# Patient Record
Sex: FEMALE | Race: WHITE | NOT HISPANIC OR LATINO | Employment: OTHER | URBAN - METROPOLITAN AREA
[De-identification: names, ages, dates, MRNs, and addresses within clinical notes are randomized per-mention and may not be internally consistent; named-entity substitution may affect disease eponyms.]

---

## 2019-08-29 ENCOUNTER — TRANSFERRED RECORDS (OUTPATIENT)
Dept: HEALTH INFORMATION MANAGEMENT | Facility: CLINIC | Age: 69
End: 2019-08-29

## 2021-09-16 ENCOUNTER — TRANSFERRED RECORDS (OUTPATIENT)
Dept: HEALTH INFORMATION MANAGEMENT | Facility: CLINIC | Age: 71
End: 2021-09-16

## 2022-03-18 ENCOUNTER — TRANSFERRED RECORDS (OUTPATIENT)
Dept: HEALTH INFORMATION MANAGEMENT | Facility: CLINIC | Age: 72
End: 2022-03-18

## 2022-06-23 ENCOUNTER — OFFICE VISIT (OUTPATIENT)
Dept: URGENT CARE | Facility: URGENT CARE | Age: 72
End: 2022-06-23
Payer: MEDICARE

## 2022-06-23 VITALS
OXYGEN SATURATION: 100 % | DIASTOLIC BLOOD PRESSURE: 75 MMHG | SYSTOLIC BLOOD PRESSURE: 110 MMHG | HEART RATE: 65 BPM | TEMPERATURE: 97.8 F | WEIGHT: 120 LBS

## 2022-06-23 DIAGNOSIS — R30.0 DYSURIA: Primary | ICD-10-CM

## 2022-06-23 LAB
ALBUMIN UR-MCNC: NEGATIVE MG/DL
APPEARANCE UR: CLEAR
BACTERIA #/AREA URNS HPF: ABNORMAL /HPF
BILIRUB UR QL STRIP: NEGATIVE
COLOR UR AUTO: YELLOW
GLUCOSE UR STRIP-MCNC: NEGATIVE MG/DL
HGB UR QL STRIP: ABNORMAL
KETONES UR STRIP-MCNC: NEGATIVE MG/DL
LEUKOCYTE ESTERASE UR QL STRIP: ABNORMAL
NITRATE UR QL: NEGATIVE
PH UR STRIP: 6 [PH] (ref 5–7)
RBC #/AREA URNS AUTO: ABNORMAL /HPF
SP GR UR STRIP: <=1.005 (ref 1–1.03)
SQUAMOUS #/AREA URNS AUTO: ABNORMAL /LPF
UROBILINOGEN UR STRIP-ACNC: 0.2 E.U./DL
WBC #/AREA URNS AUTO: ABNORMAL /HPF

## 2022-06-23 PROCEDURE — 99203 OFFICE O/P NEW LOW 30 MIN: CPT | Performed by: NURSE PRACTITIONER

## 2022-06-23 PROCEDURE — 81001 URINALYSIS AUTO W/SCOPE: CPT | Performed by: NURSE PRACTITIONER

## 2022-06-23 RX ORDER — DORZOLAMIDE HYDROCHLORIDE AND TIMOLOL MALEATE 20; 5 MG/ML; MG/ML
1 SOLUTION/ DROPS OPHTHALMIC
COMMUNITY
Start: 2022-04-04

## 2022-06-23 RX ORDER — LATANOPROST 50 UG/ML
1 SOLUTION/ DROPS OPHTHALMIC
COMMUNITY
Start: 2022-04-04 | End: 2023-02-22

## 2022-06-23 RX ORDER — SULFAMETHOXAZOLE/TRIMETHOPRIM 800-160 MG
1 TABLET ORAL 2 TIMES DAILY
Qty: 14 TABLET | Refills: 0 | Status: SHIPPED | OUTPATIENT
Start: 2022-06-23 | End: 2022-06-30

## 2022-06-23 NOTE — PROGRESS NOTES
Assessment & Plan     Dysuria    - UA reflex to Microscopic and Culture  - Urine Microscopic  - sulfamethoxazole-trimethoprim (BACTRIM DS) 800-160 MG tablet  Dispense: 14 tablet; Refill: 0  Bactrim BID x 7 days  Push fluids  Pyridium PRN - urine will be dark orange   Antibiotics twice a day  for 7 days  F/u in 1 week if persists or 3 days if worsening.          Return in about 2 days (around 6/25/2022) for with regular provider if symptoms persist.    RAFAEL Lyle CNP  [facility] DIMITRI Iqbal is a 71 year old female who presents to clinic today for the following health issues:  Chief Complaint   Patient presents with     Urgent Care     Burning pain when uniating that started last night.      HPI    UTI    Onset of symptoms was 1day(s).  Course of illness is same  Severity moderate  Current and associated symptoms dysuria, frequency, urgency and hesitancy  Treatment and measures tried Increase fluid intake  Predisposing factors include none  Patient denies flank pain, temperature > 101 degrees F., vomiting, vaginal discharge and vaginal odor              Review of Systems  Constitutional, HEENT, cardiovascular, pulmonary, GI, , musculoskeletal, neuro, skin, endocrine and psych systems are negative, except as otherwise noted.      Objective    /75 (BP Location: Left arm, Patient Position: Sitting, Cuff Size: Adult Regular)   Pulse 65   Temp 97.8  F (36.6  C) (Tympanic)   Wt 54.4 kg (120 lb)   SpO2 100%   Physical Exam   GENERAL: healthy, alert and no distress  EYES: Eyes grossly normal to inspection, PERRL and conjunctivae and sclerae normal  RESP: lungs clear to auscultation - no rales, rhonchi or wheezes  CV: regular rate and rhythm, normal S1 S2, no S3 or S4, no murmur, click or rub, no peripheral edema and peripheral pulses strong  ABDOMEN: soft, nontender, no hepatosplenomegaly, no masses and bowel sounds normal  MS: no gross musculoskeletal defects  noted, no edema  SKIN: no suspicious lesions or rashes

## 2022-06-23 NOTE — PATIENT INSTRUCTIONS
Results for orders placed or performed in visit on 06/23/22   UA reflex to Microscopic and Culture     Status: Abnormal    Specimen: Urine, Midstream   Result Value Ref Range    Color Urine Yellow Colorless, Straw, Light Yellow, Yellow    Appearance Urine Clear Clear    Glucose Urine Negative Negative mg/dL    Bilirubin Urine Negative Negative    Ketones Urine Negative Negative mg/dL    Specific Gravity Urine <=1.005 1.003 - 1.035    Blood Urine Moderate (A) Negative    pH Urine 6.0 5.0 - 7.0    Protein Albumin Urine Negative Negative mg/dL    Urobilinogen Urine 0.2 0.2, 1.0 E.U./dL    Nitrite Urine Negative Negative    Leukocyte Esterase Urine Small (A) Negative   Urine Microscopic     Status: Abnormal   Result Value Ref Range    Bacteria Urine Few (A) None Seen /HPF    RBC Urine 2-5 (A) 0-2 /HPF /HPF    WBC Urine 5-10 (A) 0-5 /HPF /HPF    Squamous Epithelials Urine Few (A) None Seen /LPF    Narrative    Urine Culture not indicated

## 2022-08-25 ENCOUNTER — TRANSFERRED RECORDS (OUTPATIENT)
Dept: HEALTH INFORMATION MANAGEMENT | Facility: CLINIC | Age: 72
End: 2022-08-25

## 2022-08-29 ENCOUNTER — OFFICE VISIT (OUTPATIENT)
Dept: FAMILY MEDICINE | Facility: CLINIC | Age: 72
End: 2022-08-29
Payer: MEDICARE

## 2022-08-29 VITALS
SYSTOLIC BLOOD PRESSURE: 103 MMHG | HEIGHT: 67 IN | DIASTOLIC BLOOD PRESSURE: 58 MMHG | WEIGHT: 120 LBS | RESPIRATION RATE: 16 BRPM | BODY MASS INDEX: 18.83 KG/M2 | OXYGEN SATURATION: 98 % | HEART RATE: 65 BPM | TEMPERATURE: 98 F

## 2022-08-29 DIAGNOSIS — R30.0 DYSURIA: Primary | ICD-10-CM

## 2022-08-29 PROBLEM — E03.8 OTHER SPECIFIED HYPOTHYROIDISM: Status: ACTIVE | Noted: 2022-08-29

## 2022-08-29 PROBLEM — F02.80 ALZHEIMER'S DISEASE (H): Status: ACTIVE | Noted: 2022-08-29

## 2022-08-29 PROBLEM — G30.9 ALZHEIMER'S DISEASE (H): Status: ACTIVE | Noted: 2022-08-29

## 2022-08-29 LAB
ALBUMIN UR-MCNC: NEGATIVE MG/DL
AMORPH CRY #/AREA URNS HPF: ABNORMAL /HPF
APPEARANCE UR: CLEAR
BACTERIA #/AREA URNS HPF: ABNORMAL /HPF
BILIRUB UR QL STRIP: NEGATIVE
COLOR UR AUTO: YELLOW
GLUCOSE UR STRIP-MCNC: NEGATIVE MG/DL
HGB UR QL STRIP: ABNORMAL
KETONES UR STRIP-MCNC: NEGATIVE MG/DL
LEUKOCYTE ESTERASE UR QL STRIP: ABNORMAL
NITRATE UR QL: NEGATIVE
PH UR STRIP: 7 [PH] (ref 5–7)
RBC #/AREA URNS AUTO: ABNORMAL /HPF
SP GR UR STRIP: 1.01 (ref 1–1.03)
SQUAMOUS #/AREA URNS AUTO: ABNORMAL /LPF
UROBILINOGEN UR STRIP-ACNC: 0.2 E.U./DL
WBC #/AREA URNS AUTO: ABNORMAL /HPF

## 2022-08-29 PROCEDURE — 99213 OFFICE O/P EST LOW 20 MIN: CPT | Performed by: INTERNAL MEDICINE

## 2022-08-29 PROCEDURE — 87086 URINE CULTURE/COLONY COUNT: CPT | Performed by: INTERNAL MEDICINE

## 2022-08-29 PROCEDURE — 87186 SC STD MICRODIL/AGAR DIL: CPT | Performed by: INTERNAL MEDICINE

## 2022-08-29 PROCEDURE — 81001 URINALYSIS AUTO W/SCOPE: CPT | Performed by: INTERNAL MEDICINE

## 2022-08-29 RX ORDER — NITROFURANTOIN 25; 75 MG/1; MG/1
100 CAPSULE ORAL 2 TIMES DAILY
Qty: 10 CAPSULE | Refills: 0 | Status: SHIPPED | OUTPATIENT
Start: 2022-08-29 | End: 2022-09-09

## 2022-08-29 NOTE — PROGRESS NOTES
This is a 71-year-old female with dementia who presents with her daughter.  She was supposed to be here to establish care but the doctor who she was supposed to see today is out ill also I am seeing her for her urine symptoms.  She does have a history of UTIs last being June.  The last 2 days she has had dysuria and possibly hematuria.  She denies abdominal pain or nausea or diarrhea.  No fevers.  She is not unsteady or falling and there is no other health changes that we know of.  She does have dementia so I am not entirely sure that her history is 100% accurate but the daughter does feel as if what she is saying is correct.  The patient herself lives in a [facility] by herself and does fairly well.  She is  and has 1 child.  She otherwise notes she is feeling fine with no other complaints.    Past Medical History:   Diagnosis Date     Alzheimer's disease (H)     eval at [place]     Other specified hypothyroidism      Past Surgical History:   Procedure Laterality Date     CATARACT EXTRACTION       Social History     Socioeconomic History     Marital status:      Spouse name: Not on file     Number of children: 1     Years of education: Not on file     Highest education level: Not on file   Occupational History     Not on file   Tobacco Use     Smoking status: Never Smoker     Smokeless tobacco: Never Used   Substance and Sexual Activity     Alcohol use: Not Currently     Drug use: Not on file     Sexual activity: Not on file   Other Topics Concern     Not on file   Social History Narrative     Not on file     Social Determinants of Health     Financial Resource Strain: Not on file   Food Insecurity: Not on file   Transportation Needs: Not on file   Physical Activity: Not on file   Stress: Not on file   Social Connections: Not on file   Intimate Partner Violence: Not on file   Housing Stability: Not on file     Current Outpatient Medications   Medication Sig Dispense Refill     dorzolamide-timolol  "(COSOPT) 2-0.5 % ophthalmic solution Apply 1 drop to eye       latanoprost (XALATAN) 0.005 % ophthalmic solution Apply 1 drop to eye       LEVOTHYROXINE SODIUM PO Take by mouth At Bedtime       No Known Allergies  FAMILY HISTORY NOTED AND REVIEWED    REVIEW OF SYSTEMS: above    PHYSICAL EXAM    /58 (BP Location: Right arm, Patient Position: Chair, Cuff Size: Adult Large)   Pulse 65   Temp 98  F (36.7  C) (Tympanic)   Resp 16   Ht 1.702 m (5' 7\")   Wt 54.4 kg (120 lb)   SpO2 98%   Breastfeeding No   BMI 18.79 kg/m      Patient appears non toxic  Lungs clear  cv reglar rate and rhythm, no murmer, rub or gallop, no jvp or edema  Abdomen normal active bowel sounds, soft non-tender, no mgr, no hepatosplenomegaly    Stat ua noted, uc sent    ASSESSMENT:  1. Uncomplicated uti, doubt other cause, no signs sepsis, pyelo, etc  2. Dementia, presumed alzheimers  3. Hypothyroidism    PLAN:  macrobid bid for 5 days  Follow up to establish primary care  Call if symptoms not resolving in next 24 to 48 hours or gets worse    Justin Fu M.D.        "

## 2022-08-31 LAB — BACTERIA UR CULT: ABNORMAL

## 2022-09-05 ENCOUNTER — NURSE TRIAGE (OUTPATIENT)
Dept: NURSING | Facility: CLINIC | Age: 72
End: 2022-09-05

## 2022-09-05 ENCOUNTER — OFFICE VISIT (OUTPATIENT)
Dept: URGENT CARE | Facility: URGENT CARE | Age: 72
End: 2022-09-05
Payer: MEDICARE

## 2022-09-05 VITALS
DIASTOLIC BLOOD PRESSURE: 62 MMHG | OXYGEN SATURATION: 99 % | TEMPERATURE: 97.1 F | HEART RATE: 63 BPM | WEIGHT: 115 LBS | SYSTOLIC BLOOD PRESSURE: 98 MMHG | BODY MASS INDEX: 18.01 KG/M2

## 2022-09-05 DIAGNOSIS — R30.0 DYSURIA: Primary | ICD-10-CM

## 2022-09-05 DIAGNOSIS — N39.0 URINARY TRACT INFECTION WITHOUT HEMATURIA, SITE UNSPECIFIED: ICD-10-CM

## 2022-09-05 DIAGNOSIS — R30.0 DYSURIA: ICD-10-CM

## 2022-09-05 LAB
ALBUMIN UR-MCNC: 100 MG/DL
APPEARANCE UR: ABNORMAL
BACTERIA #/AREA URNS HPF: ABNORMAL /HPF
BILIRUB UR QL STRIP: NEGATIVE
COLOR UR AUTO: YELLOW
GLUCOSE UR STRIP-MCNC: NEGATIVE MG/DL
HGB UR QL STRIP: ABNORMAL
KETONES UR STRIP-MCNC: NEGATIVE MG/DL
LEUKOCYTE ESTERASE UR QL STRIP: ABNORMAL
NITRATE UR QL: NEGATIVE
PH UR STRIP: 7 [PH] (ref 5–7)
RBC #/AREA URNS AUTO: ABNORMAL /HPF
SP GR UR STRIP: 1.01 (ref 1–1.03)
UROBILINOGEN UR STRIP-ACNC: 0.2 E.U./DL
WBC #/AREA URNS AUTO: >100 /HPF
WBC CLUMPS #/AREA URNS HPF: PRESENT /HPF

## 2022-09-05 PROCEDURE — 81001 URINALYSIS AUTO W/SCOPE: CPT

## 2022-09-05 PROCEDURE — 87086 URINE CULTURE/COLONY COUNT: CPT | Performed by: PHYSICIAN ASSISTANT

## 2022-09-05 PROCEDURE — 99213 OFFICE O/P EST LOW 20 MIN: CPT | Performed by: PHYSICIAN ASSISTANT

## 2022-09-05 RX ORDER — CEFDINIR 300 MG/1
300 CAPSULE ORAL 2 TIMES DAILY
Qty: 14 CAPSULE | Refills: 0 | Status: SHIPPED | OUTPATIENT
Start: 2022-09-05 | End: 2022-09-05

## 2022-09-05 RX ORDER — CEFDINIR 300 MG/1
300 CAPSULE ORAL 2 TIMES DAILY
Qty: 14 CAPSULE | Refills: 0 | Status: SHIPPED | OUTPATIENT
Start: 2022-09-05 | End: 2022-09-15

## 2022-09-05 NOTE — PROGRESS NOTES
Assessment & Plan     Dysuria    Urine culture pending  UA positive    - UA reflex to Microscopic and Culture  - Urine Microscopic Exam  - Urine Culture  - cefdinir (OMNICEF) 300 MG capsule; Take 1 capsule (300 mg) by mouth 2 times daily for 7 days    Urinary tract infection without hematuria, site unspecified    Bladder infections are not contagious. You can't get one from someone else, from a toilet seat, or from sharing a bath.  The most common cause of bladder infections is bacteria from the bowels. The bacteria get onto the skin around the opening of the urethra. From there, they can get into the urine. Then they travel up to the bladder, causing inflammation and infection. This often happens because of:    Wiping incorrectly after urinating. Always wipe from front to back.    Bowel incontinence    Pregnancy    Procedures such as having a catheter put in    Older age    Not emptying your bladder. This can give bacteria a chance to grow in your urine.    Fluid loss (dehydration)    Constipation    Having sex    Using a diaphragm for birth control   Treatment  Bladder infections are diagnosed by a urine test and urine culture. They are treated with antibiotics. They often clear up quickly without problems. Treatment helps prevent a more serious kidney infection.  Medicines  Medicines can help in the treatment of a bladder infection:    Take antibiotics until they are used up, even if you feel better. It's important to finish them to make sure the infection has cleared.    You can use acetaminophen or ibuprofen for pain, fever, or discomfort, unless another medicine was prescribed. If you have long-term (chronic) liver or kidney disease, talk with your healthcare provider before using these medicines. Also talk with your provider if you've ever had a stomach ulcer or GI (gastrointestinal) bleeding, or are taking blood-thinner medicines.    If you are given phenazopydridine to reduce burning with urination, it  will make your urine a bright orange color. This can stain clothing.  Care and prevention  These self-care steps can help prevent future infections:    Drink plenty of fluids. This helps to prevent dehydration and flush out your bladder. Do this unless you must restrict fluids for other health reasons, or your healthcare provider told you not to.    Clean yourself correctly after going to the bathroom. Wipe from front to back after using the toilet. This helps prevent the spread of bacteria.    Urinate more often. Don't try to hold urine in for a long time.    Wear loose-fitting clothes and cotton underwear. Don't wear tight-fitting pants.    Improve your diet and prevent constipation. Eat more fresh fruits and vegetables, and fiber. Eat less junk foods and fatty foods.    Don't have sex until your symptoms are gone.    Don't have caffeine, alcohol, and spicy foods. These can irritate your bladder.    Urinate right after you have sex to flush out your bladder.    - cefdinir (OMNICEF) 300 MG capsule; Take 1 capsule (300 mg) by mouth 2 times daily for 7 days    Review of external notes as documented elsewhere in note    Results for orders placed or performed in visit on 09/05/22   UA reflex to Microscopic and Culture     Status: Abnormal    Specimen: Urine, Midstream   Result Value Ref Range    Color Urine Yellow Colorless, Straw, Light Yellow, Yellow    Appearance Urine Slightly Cloudy (A) Clear    Glucose Urine Negative Negative mg/dL    Bilirubin Urine Negative Negative    Ketones Urine Negative Negative mg/dL    Specific Gravity Urine 1.010 1.003 - 1.035    Blood Urine Large (A) Negative    pH Urine 7.0 5.0 - 7.0    Protein Albumin Urine 100  (A) Negative mg/dL    Urobilinogen Urine 0.2 0.2, 1.0 E.U./dL    Nitrite Urine Negative Negative    Leukocyte Esterase Urine Moderate (A) Negative   Urine Microscopic Exam     Status: Abnormal   Result Value Ref Range    Bacteria Urine Moderate (A) None Seen /HPF    RBC Urine  10-25 (A) 0-2 /HPF /HPF    WBC Urine >100 (A) 0-5 /HPF /HPF    WBC Clumps Urine Present (A) None Seen /HPF         No follow-ups on file.    Jourdan Loera, [facility], PA-C  [facility] KAROLINA Iqbal is a 71 year old, presenting for the following health issues:  UTI (Pt finished macrobrid for a uti a couple days ago , still having pain with urination.) and Urgent Care      HPI           Review of Systems         Objective    BP 98/62   Pulse 63   Temp 97.1  F (36.2  C) (Tympanic)   Wt 52.2 kg (115 lb)   SpO2 99%   BMI 18.01 kg/m    Body mass index is 18.01 kg/m .  Physical Exam                       [unfilled]  @[facility]@

## 2022-09-05 NOTE — TELEPHONE ENCOUNTER
Triage note    Caller name: Hue  Relation to patient: sibling  No CTC on file. No PHI shared.    Hue states that Farida was given an order for a UTI. She states the pharmacy it went to is closed. They would like the order for cefdinir 300mg BID x7 days to go to the [facility].    Resending Rx, as requested.            Elizabeth Wilder RN   09/05/22 2:10 PM  [facility] Nurse Advisor

## 2022-09-05 NOTE — TELEPHONE ENCOUNTER
Reason for Disposition    [1] Prescription prescribed recently is not at pharmacy AND [2] triager has access to patient's EMR AND [3] prescription is recorded in the EMR    Additional Information    Negative: [1] Caller has NON-URGENT medicine question about med that PCP prescribed AND [2] triager unable to answer question    Negative: Caller wants to use a complementary or alternative medicine    Negative: Prescription request for new medicine (not a refill)    Negative: Medicine patch causing local rash or itching    Negative: [1] Caller has medicine question about med NOT prescribed by PCP AND [2] triager unable to answer question (e.g., compatibility with other med, storage)    Negative: MORE THAN A DOUBLE DOSE of a prescription or over-the-counter (OTC) drug    Negative: [1] DOUBLE DOSE (an extra dose or lesser amount) of prescription drug AND [2] any symptoms (e.g., dizziness, nausea, pain, sleepiness)    Negative: [1] DOUBLE DOSE (an extra dose or lesser amount) of over-the-counter (OTC) drug AND [2] any symptoms (e.g., dizziness, nausea, pain, sleepiness)    Negative: Took another person's prescription drug    Negative: [1] DOUBLE DOSE (an extra dose or lesser amount) of prescription drug AND [2] NO symptoms (Exception: a double dose of antibiotics)    Negative: Diabetes drug error or overdose (e.g., took wrong type of insulin or took extra dose)    Negative: [1] Prescription not at pharmacy AND [2] was prescribed by PCP recently (Exception: triager has access to EMR and prescription is recorded there. Go to Home Care and confirm for pharmacy.)    Negative: [1] Pharmacy calling with prescription question AND [2] triager unable to answer question    Negative: [1] Caller has URGENT medicine question about med that PCP or specialist prescribed AND [2] triager unable to answer question    Protocols used: MEDICATION QUESTION CALL-A-

## 2022-09-07 LAB — BACTERIA UR CULT: NO GROWTH

## 2022-09-09 ENCOUNTER — OFFICE VISIT (OUTPATIENT)
Dept: FAMILY MEDICINE | Facility: CLINIC | Age: 72
End: 2022-09-09
Payer: MEDICARE

## 2022-09-09 VITALS
DIASTOLIC BLOOD PRESSURE: 70 MMHG | HEIGHT: 67 IN | OXYGEN SATURATION: 97 % | HEART RATE: 59 BPM | SYSTOLIC BLOOD PRESSURE: 107 MMHG | BODY MASS INDEX: 18.36 KG/M2 | WEIGHT: 117 LBS | TEMPERATURE: 97.7 F | RESPIRATION RATE: 16 BRPM

## 2022-09-09 DIAGNOSIS — N76.0 VAGINITIS AND VULVOVAGINITIS: ICD-10-CM

## 2022-09-09 DIAGNOSIS — R30.0 DYSURIA: ICD-10-CM

## 2022-09-09 DIAGNOSIS — N30.00 ACUTE CYSTITIS WITHOUT HEMATURIA: Primary | ICD-10-CM

## 2022-09-09 LAB
ALBUMIN UR-MCNC: NEGATIVE MG/DL
APPEARANCE UR: CLEAR
BILIRUB UR QL STRIP: NEGATIVE
CLUE CELLS: ABNORMAL
COLOR UR AUTO: YELLOW
GLUCOSE UR STRIP-MCNC: NEGATIVE MG/DL
HGB UR QL STRIP: NEGATIVE
KETONES UR STRIP-MCNC: NEGATIVE MG/DL
LEUKOCYTE ESTERASE UR QL STRIP: NEGATIVE
NITRATE UR QL: NEGATIVE
PH UR STRIP: 7 [PH] (ref 5–7)
RBC #/AREA URNS AUTO: ABNORMAL /HPF
SP GR UR STRIP: 1.02 (ref 1–1.03)
TRICHOMONAS, WET PREP: ABNORMAL
UROBILINOGEN UR STRIP-ACNC: 0.2 E.U./DL
WBC #/AREA URNS AUTO: ABNORMAL /HPF
WBC'S/HIGH POWER FIELD, WET PREP: ABNORMAL
YEAST, WET PREP: ABNORMAL

## 2022-09-09 PROCEDURE — 99214 OFFICE O/P EST MOD 30 MIN: CPT | Performed by: INTERNAL MEDICINE

## 2022-09-09 PROCEDURE — 81001 URINALYSIS AUTO W/SCOPE: CPT | Performed by: INTERNAL MEDICINE

## 2022-09-09 PROCEDURE — 87210 SMEAR WET MOUNT SALINE/INK: CPT | Performed by: INTERNAL MEDICINE

## 2022-09-09 RX ORDER — FLUCONAZOLE 150 MG/1
150 TABLET ORAL ONCE
Qty: 1 TABLET | Refills: 0 | Status: SHIPPED | OUTPATIENT
Start: 2022-09-09 | End: 2022-09-09

## 2022-09-09 ASSESSMENT — PAIN SCALES - GENERAL: PAINLEVEL: NO PAIN (0)

## 2022-09-09 NOTE — PATIENT INSTRUCTIONS
LAB today    STOP the antibiotic (CEFDINIR)    START:  FLUCONAZOLE once (Rx sent).  This is presumptive as I may not have the lab results before I leave clinic today

## 2022-09-09 NOTE — Clinical Note
Team, please call patient/sister- her labs are negative today.  She was prescribed fluconazole which she may take.  But given negative results and symptoms would have her see Urology as well.  Referral is entered.  Thank you.

## 2022-09-09 NOTE — LETTER
September 14, 2022      Farida Burk  [address] [facility] [address]        Dear ,    We have been unable to reach you or your sister by phone.    Dr. Alfaro notes that labs are negative and you were prescribed fluconazole which is / was fine to take.  But given negative results and symptoms would have you see Urology as well.  Referral is entered.  "[facility]th [place] will call you to coordinate care as prescribed your provider. If you don t hear from a representative within 2 business days, please call [phone].      If you have any questions or concerns, please call the clinic at the number listed above.       Sincerely,    [facility] Team per Dr. Alfaro

## 2022-09-09 NOTE — PROGRESS NOTES
"  Assessment & Plan     Acute cystitis without hematuria  No improvement on cephalosporin.  Also, culture negative.  Recheck urine today.    - UA with Microscopic reflex to Culture - lab collect    Vaginitis and vulvovaginitis  Negative for yeast, no concern for BV.      - Wet prep - lab collect  - fluconazole (DIFLUCAN) 150 MG tablet  Dispense: 1 tablet; Refill: 0  - Wet prep - lab collect    Dysuria  As above. Given 3rd visit for UTI sx and culture negative will refer to Urology for eval.  May benefit from cysto  - Adult Urology  Referral  - UA with Microscopic reflex to Culture - lab collect            No follow-ups on file.    Adrian Alfaro MD  [facility] SHELTON Iqbal is a 71 year old, presenting for the following health issues:  UTI      HPI     Pt is new to me.  Has been seen in clinic before though.  Describes dysuria, frequency and urgency.      She has no fever, flank pain, diarrhea.      Was at  9/5. UA positive but culture negative.      Had UTI sx in June as well. Dip available.  No culture.        Review of Systems         Objective    /70 (BP Location: Right arm, Patient Position: Sitting, Cuff Size: Adult Regular)   Pulse 59   Temp 97.7  F (36.5  C) (Temporal)   Resp 16   Ht 1.702 m (5' 7\")   Wt 53.1 kg (117 lb)   SpO2 97%   BMI 18.32 kg/m    Body mass index is 18.32 kg/m .  Physical Exam   GENERAL: healthy, alert and no distress  NECK: no adenopathy, no asymmetry, masses, or scars and thyroid normal to palpation  RESP: lungs clear to auscultation - no rales, rhonchi or wheezes  CV: regular rate and rhythm, normal S1 S2, no S3 or S4, no murmur, click or rub, no peripheral edema and peripheral pulses strong  ABDOMEN: soft, nontender, no hepatosplenomegaly, no masses and bowel sounds normal  MS: no gross musculoskeletal defects noted, no edema                    "

## 2022-09-12 ENCOUNTER — TRANSFERRED RECORDS (OUTPATIENT)
Dept: HEALTH INFORMATION MANAGEMENT | Facility: CLINIC | Age: 72
End: 2022-09-12

## 2022-09-12 LAB
CHOLESTEROL (EXTERNAL): 171 MG/DL (ref 0–200)
CREATININE (EXTERNAL): 0.7 MG/DL (ref 0.7–1.5)
GFR ESTIMATED (EXTERNAL): 88 ML/MIN (ref 60–128)
GLUCOSE (EXTERNAL): 109 MG/DL (ref 70–110)
HDLC SERPL-MCNC: 43 MG/DL (ref 34–100)
LDL CHOLESTEROL CALCULATED (EXTERNAL): 111 MG/DL (ref 74–130)
POTASSIUM (EXTERNAL): 4.6 MMOL/L (ref 3.5–5.5)
TRIGLYCERIDES (EXTERNAL): 84 MG/DL (ref 0–200)
TSH SERPL-ACNC: 0.5 MIU/ML (ref 0.4–5.5)

## 2022-09-14 NOTE — NURSING NOTE
CC Chart pool =  Message  Received: 5 days ago  Adrian Alfaro MD  [facility]  Team, please call patient/sister- her labs are negative today.  She was prescribed fluconazole which she may take.  But given negative results and symptoms would have her see Urology as well.  Referral is entered.  Thank you.       I was unable to reach pt or sister Hue so sent letter with referrral info.       Jennifer KNIGHT MA'

## 2022-09-15 ENCOUNTER — NURSE TRIAGE (OUTPATIENT)
Dept: FAMILY MEDICINE | Facility: CLINIC | Age: 72
End: 2022-09-15

## 2022-09-15 ENCOUNTER — OFFICE VISIT (OUTPATIENT)
Dept: URGENT CARE | Facility: URGENT CARE | Age: 72
End: 2022-09-15
Payer: MEDICARE

## 2022-09-15 VITALS
DIASTOLIC BLOOD PRESSURE: 75 MMHG | TEMPERATURE: 97.7 F | SYSTOLIC BLOOD PRESSURE: 116 MMHG | RESPIRATION RATE: 12 BRPM | OXYGEN SATURATION: 100 % | HEART RATE: 56 BPM

## 2022-09-15 DIAGNOSIS — N30.01 ACUTE CYSTITIS WITH HEMATURIA: Primary | ICD-10-CM

## 2022-09-15 DIAGNOSIS — K64.4 EXTERNAL HEMORRHOIDS: ICD-10-CM

## 2022-09-15 DIAGNOSIS — R30.0 DYSURIA: ICD-10-CM

## 2022-09-15 LAB
ALBUMIN UR-MCNC: 30 MG/DL
APPEARANCE UR: CLEAR
BACTERIA #/AREA URNS HPF: ABNORMAL /HPF
BILIRUB UR QL STRIP: NEGATIVE
COLOR UR AUTO: YELLOW
GLUCOSE UR STRIP-MCNC: NEGATIVE MG/DL
HGB UR QL STRIP: ABNORMAL
KETONES UR STRIP-MCNC: NEGATIVE MG/DL
LEUKOCYTE ESTERASE UR QL STRIP: ABNORMAL
NITRATE UR QL: NEGATIVE
PH UR STRIP: 6.5 [PH] (ref 5–7)
RBC #/AREA URNS AUTO: ABNORMAL /HPF
SP GR UR STRIP: <=1.005 (ref 1–1.03)
SQUAMOUS #/AREA URNS AUTO: ABNORMAL /LPF
UROBILINOGEN UR STRIP-ACNC: 0.2 E.U./DL
WBC #/AREA URNS AUTO: ABNORMAL /HPF

## 2022-09-15 PROCEDURE — 81001 URINALYSIS AUTO W/SCOPE: CPT | Performed by: NURSE PRACTITIONER

## 2022-09-15 PROCEDURE — 87186 SC STD MICRODIL/AGAR DIL: CPT | Performed by: NURSE PRACTITIONER

## 2022-09-15 PROCEDURE — 87086 URINE CULTURE/COLONY COUNT: CPT | Performed by: NURSE PRACTITIONER

## 2022-09-15 PROCEDURE — 99215 OFFICE O/P EST HI 40 MIN: CPT | Performed by: NURSE PRACTITIONER

## 2022-09-15 RX ORDER — CEPHALEXIN 500 MG/1
500 CAPSULE ORAL 2 TIMES DAILY
Qty: 14 CAPSULE | Refills: 0 | Status: SHIPPED | OUTPATIENT
Start: 2022-09-15 | End: 2022-09-22

## 2022-09-15 NOTE — TELEPHONE ENCOUNTER
"Pts sister devan (pt gave verbal consent to talk to her about health information). Pts sister was following up from earlier call- Pt told her sister just now that she thinks the blood may be coming from her stool. Per pts sister- pt has early stage of alzheimer's.     Per pt report-  Bright red blood- happened during the night when she urinated/had a BM.   Combo of soft/hard stools   No rectal pain   Hasn't had a BM today     Advised pts sister to bring her to ED/UCC now as no appts available in [facility] today. Pts sister stated she or her sister will take pt to either [facility] or [facility] today. Pts sister asked if pt should start taking the abx/medication they have on \"hand\". Advised pts sister not to as pt should be evaluated today and then the provider can make a decision on the best tx options. Pts sister agreed to plan.   Reason for Disposition    Patient sounds very sick or weak to the triager    Additional Information    Negative: Passed out (i.e., fainted, collapsed and was not responding)    Negative: Shock suspected (e.g., cold/pale/clammy skin, too weak to stand, low BP, rapid pulse)    Negative: Vomiting red blood or black (coffee ground) material    Negative: Sounds like a life-threatening emergency to the triager    Negative: Diarrhea is main symptom    Negative: Rectal symptoms    Negative: SEVERE rectal bleeding (large blood clots; constant or on and off bleeding)    Negative: SEVERE dizziness (e.g., unable to stand, requires support to walk, feels like passing out now)    Negative: MODERATE rectal bleeding (small blood clots, passing blood without stool, or toilet water turns red) more than once a day    Negative: Bloody, black, or tarry bowel movements  (Exception: Chronic-unchanged black-grey bowel movements and is taking iron pills or Pepto-Bismol.)    Negative: High-risk adult (e.g., prior surgery on aorta, abdominal aortic aneurysm)    Negative: Rectal foreign body (inserted or " swallowed)    Protocols used: RECTAL BLEEDING-A-[place]

## 2022-09-15 NOTE — PROGRESS NOTES
Chief Complaint   Patient presents with     Hematuria     Today, seen in  9/9/22 for yeast, notice blood in stool          ICD-10-CM    1. Acute cystitis with hematuria  N30.01 cephALEXin (KEFLEX) 500 MG capsule   2. Dysuria  R30.0 UA macro with reflex to Microscopic and Culture - Clinc Collect     Urine Microscopic Exam     Urine Culture   3. External hemorrhoids  K64.4    Advised the use of no scented products around the genital area.  Recommended warm sitz bath's twice a day.  We will treat with cephalexin for UTI.  We discussed ways of wiping properly and thoroughly cleaning the genital area.  Advised her to increase water intake and if symptoms have not completely resolved by the end of medication she should return to primary care provider for recheck.  If she develops fever, chills, nausea vomiting, confusion beyond her normal baseline dementia or back pain she should go to the emergency room for further assessment and treatment.    Advised patient to avoid bearing down for the next periods of time along with prolonged sitting.  He may use ice to the anal area if hemorrhoids become comfortable or topical Preparation H.    44 minutes spent on the date of the encounter doing chart review, history and exam, documentation and further activities per the note      Results for orders placed or performed in visit on 09/15/22 (from the past 24 hour(s))   UA macro with reflex to Microscopic and Culture - Clinc Collect    Specimen: Urine, Clean Catch   Result Value Ref Range    Color Urine Yellow Colorless, Straw, Light Yellow, Yellow    Appearance Urine Clear Clear    Glucose Urine Negative Negative mg/dL    Bilirubin Urine Negative Negative    Ketones Urine Negative Negative mg/dL    Specific Gravity Urine <=1.005 1.003 - 1.035    Blood Urine Large (A) Negative    pH Urine 6.5 5.0 - 7.0    Protein Albumin Urine 30  (A) Negative mg/dL    Urobilinogen Urine 0.2 0.2, 1.0 E.U./dL    Nitrite Urine Negative Negative     Leukocyte Esterase Urine Large (A) Negative   Urine Microscopic Exam   Result Value Ref Range    Bacteria Urine Moderate (A) None Seen /HPF    RBC Urine 0-2 0-2 /HPF /HPF    WBC Urine 25-50 (A) 0-5 /HPF /HPF    Squamous Epithelials Urine Few (A) None Seen /LPF       Subjective     Farida Burk is an 71 year old female who presents to clinic today for dysuria, urgency of urination for several days.  She has been seen twice over the last month for urinary symptoms but neither culture has returned positive.  She also reports noticing some blood with defecation on the toilet paper and in the toilet.  This was bright red in color and she was having some pain as the stool passed through the rectum.    Patient does have some dementia and sister is here to assist with giving history.    ROS: 10 point ROS neg other than the symptoms noted above in the HPI.       Objective    /75 (BP Location: Right arm)   Pulse 56   Temp 97.7  F (36.5  C) (Tympanic)   Resp 12   SpO2 100%   Nurses notes and VS have been reviewed.    Physical Exam       GENERAL APPEARANCE: healthy appearing, alert     EYES: PERRL, EOMI, sclera non-icteric     HENT: oral exam benign, mucus membranes intact, without ulcers or lesions     NECK: no adenopathy or asymmetry, thyroid normal to palpation     RESP: lungs clear to auscultation - no rales, rhonchi or wheezes     CV: regular rates and rhythm, no murmurs, rubs, or gallop     ABDOMEN:  soft, nontender, no HSM or masses and bowel sounds normal, no CVA tenderness   GU_female: Labia are slightly red and irritated, no swelling, urethra that is very red and swollen, stool is noted dried on her inner thighs and around the rectal area which also demonstrates a very small external hemorrhoid     MS: extremities normal- no gross deformities noted; normal muscle tone.     SKIN: no suspicious lesions or rashes    Patient Instructions   Soak your bottom in a warm tub with no soap or bubble bath for 15 to 20  minutes twice a day.    Make sure you are only wiping from the front to the back.    Be sure to clean thoroughly and the genital area when you are showering.  Rinse also up from the area.    Avoid long periods of sitting or bearing down to have bowel movements as these may induce more hemorrhoids.    If you notice black stools or dark red in the stools please notify your primary care provider.    Do not use any scented products around the genital area    Be sure you are drinking plenty of water.    If you develop low back pain, fever, chills, confusion or nausea and vomiting please go to the emergency room immediately.      RAFAEL Aguiar, CNP  [facility] Provider    The use of Dragon/OP3Nvoice dictation services may have been used to construct the content in this note; any grammatical or spelling errors are non-intentional. Please contact the author of this note directly if you are in need of any clarification.

## 2022-09-15 NOTE — PATIENT INSTRUCTIONS
Soak your bottom in a warm tub with no soap or bubble bath for 15 to 20 minutes twice a day.    Make sure you are only wiping from the front to the back.    Be sure to clean thoroughly and the genital area when you are showering.  Rinse also up from the area.    Avoid long periods of sitting or bearing down to have bowel movements as these may induce more hemorrhoids.    If you notice black stools or dark red in the stools please notify your primary care provider.    Do not use any scented products around the genital area    Be sure you are drinking plenty of water.    If you develop low back pain, fever, chills, confusion or nausea and vomiting please go to the emergency room immediately.

## 2022-09-15 NOTE — TELEPHONE ENCOUNTER
Nurse Triage SBAR    Is this a 2nd Level Triage? YES, LICENSED PRACTITIONER REVIEW IS REQUIRED    Situation: Sister Mira, (verbal CTC), stating pt c/o new onset of hematuria and burning with urnation since last night. No fever, flank, abdominal pain, or urgency present. States pain is 9/10 with urination and urine is bright red.    Berna, states that abx Cefdinir given at UC visit on 9/5/22. Clarified that pt never took this med. She just picked it up yesterday. Did not start since she had been feeling fine and had since seen Dr Alfaro who r/o UTI and was given Rx for yeast infection.     Sister also given number to call for recent Urology referral given at last visit.    Background: Seen recently for similar sx x 3 visits    Assessment: See below    Protocol Recommended Disposition:   See in Office Today or Tomorrow    Recommendation: See in office today or tomorrow per protocol. Pt requesting to be seen. No availability. Please advise.     Routed to provider    Does the patient meet one of the following criteria for ADS visit consideration? 16+ years old, with an [facility] PCP     TIP  Providers, please consider if this condition is appropriate for management at one of our Acute and Diagnostic Services sites.     If patient is a good candidate, please use dotphrase <dot>triageresponse and select Refer to ADS to document.    Triage to call sister Berna (verbal CTC) at [phone]. OK to leave detailed VM    Reason for Disposition    Patient wants to be seen    Additional Information    Negative: Shock suspected (e.g., cold/pale/clammy skin, too weak to stand, low BP, rapid pulse)    Negative: Sounds like a life-threatening emergency to the triager    Negative: Followed a female genital area injury (e.g., vagina, vulva)    Negative: Followed a male genital area injury (penis, scrotum)    Negative: Vaginal discharge    Negative: Pus (white, yellow) or bloody discharge from end of penis    Negative: Pain or burning with  "passing urine (urination) and pregnant    Negative: Pain or burning with passing urine (urination) and female    Negative: Pain or burning with passing urine (urination) and male    Negative: Pain or itching in the vulvar area    Negative: Pain in scrotum is main symptom    Negative: Blood in the urine is main symptom    Negative: Symptoms arising from use of a urinary catheter (e.g., coude, Villagomez)    Negative: Unable to urinate (or only a few drops) > 4 hours and bladder feels very full (e.g., palpable bladder or strong urge to urinate)    Negative: Decreased urination and drinking very little and dehydration suspected (e.g., dark urine, no urine > 12 hours, very dry mouth, very lightheaded)    Negative: Patient sounds very sick or weak to the triager    Negative: Fever > 100.4 F  (38.0 C)    Negative: Side (flank) or lower back pain present    Negative: Can't control passage of urine (i.e., urinary incontinence) and new-onset (< 2 weeks) or worsening    Negative: Urinating more frequently than usual (i.e., frequency)    Negative: Bad or foul-smelling urine    Answer Assessment - Initial Assessment Questions  1. SYMPTOM: \"What's the main symptom you're concerned about?\" (e.g., frequency, incontinence)      Burning with urination and blood in urine  2. ONSET: \"When did the  Pain and blood  start?\"      Last night     3. PAIN: \"Is there any pain?\" If Yes, ask: \"How bad is it?\" (Scale: 1-10; mild, moderate, severe)        4. CAUSE: \"What do you think is causing the symptoms?\"     UTI    5. OTHER SYMPTOMS: \"Do you have any other symptoms?\" (e.g., fever, flank pain, blood in urine, pain with urination)      Blood in urine, burning with urination    6. PREGNANCY: \"Is there any chance you are pregnant?\" \"When was your last menstrual period?\"      no    Protocols used: URINARY SYMPTOMS-A-OH    Nessa Barrera, NICKOLAS  Thu Sep 15, 2022 11:20 AM   SEE IN OFFICE TODAY OR TOMORROW:   * You need to be examined. Let me give you an " appointment.  * IF NO AVAILABLE OFFICE APPOINTMENTS: You need to be seen in an Urgent Care Center. Go to the one at . Go there today. A nearby Urgent Care Center is often a good source of care.      CALL BACK IF:  * Fever occurs  * Pain or burning with urination  * Unable to urinate and bladder feels full  * You become worse      CAFFEINE, ALCOHOL, AND FOODS:  * Avoid caffeine and alcohol, especially during the 4 hours before bedtime.  * Certain foods may irritate the bladder in some people. Examples are highly spiced foods, acidic foods (tomato, grapefruit), and carbonated beverages.        Patient/Caregiver understands and will follow care advice? Yes, plans to follow advice          Nessa CHRISTOPHER RN  EP Triage

## 2022-09-16 NOTE — TELEPHONE ENCOUNTER
Triage talked to pt's sister. Pt was seen at  last night. She will contact her other sibling and follow up call clinic back to schedule follow up visit if needed.

## 2022-09-17 LAB — BACTERIA UR CULT: ABNORMAL

## 2022-09-22 ENCOUNTER — TRANSFERRED RECORDS (OUTPATIENT)
Dept: HEALTH INFORMATION MANAGEMENT | Facility: CLINIC | Age: 72
End: 2022-09-22

## 2022-11-30 ENCOUNTER — OFFICE VISIT (OUTPATIENT)
Dept: FAMILY MEDICINE | Facility: CLINIC | Age: 72
End: 2022-11-30
Payer: MEDICARE

## 2022-11-30 VITALS
TEMPERATURE: 97.2 F | OXYGEN SATURATION: 99 % | BODY MASS INDEX: 19.1 KG/M2 | DIASTOLIC BLOOD PRESSURE: 62 MMHG | HEIGHT: 67 IN | SYSTOLIC BLOOD PRESSURE: 99 MMHG | RESPIRATION RATE: 14 BRPM | WEIGHT: 121.7 LBS | HEART RATE: 78 BPM

## 2022-11-30 DIAGNOSIS — G30.0 MODERATE EARLY ONSET ALZHEIMER'S DEMENTIA WITH MOOD DISTURBANCE (H): Primary | ICD-10-CM

## 2022-11-30 DIAGNOSIS — Z12.11 SCREEN FOR COLON CANCER: ICD-10-CM

## 2022-11-30 DIAGNOSIS — Z12.31 VISIT FOR SCREENING MAMMOGRAM: ICD-10-CM

## 2022-11-30 DIAGNOSIS — F02.B3 MODERATE EARLY ONSET ALZHEIMER'S DEMENTIA WITH MOOD DISTURBANCE (H): Primary | ICD-10-CM

## 2022-11-30 DIAGNOSIS — F32.1 CURRENT MODERATE EPISODE OF MAJOR DEPRESSIVE DISORDER WITHOUT PRIOR EPISODE (H): ICD-10-CM

## 2022-11-30 PROCEDURE — 99213 OFFICE O/P EST LOW 20 MIN: CPT | Performed by: INTERNAL MEDICINE

## 2022-11-30 ASSESSMENT — PAIN SCALES - GENERAL: PAINLEVEL: NO PAIN (0)

## 2022-11-30 NOTE — PATIENT INSTRUCTIONS
Please call me about levothyroxine dose and donepezil dose     You are due for mammogram.  Please call the following number to make appointment :  [phone]  It is located in [address]

## 2022-11-30 NOTE — PROGRESS NOTES
"  Assessment & Plan     Moderate early onset Alzheimer's dementia with mood disturbance (H)  She is under neurology care for this.     Current moderate episode of major depressive disorder without prior episode (H)  Encouraged her to seek therapy.   - Adult Mental Health  Referral; Future    Visit for screening mammogram  - MA SCREENING DIGITAL BILAT - Future  (s+30); Future    Screen for colon cancer  - Fecal colorectal cancer screen (FIT); Future     See Patient Instructions    Return in about 6 months (around 5/30/2023) for Follow up, Routine preventive, with me.    ALLI CUEVAS MD  [facility] [place] Iqbal is a 72 year old, presenting for the following health issues:  Establish Care    REINA Iqbal is a very nice 72 year old lady who presented to the clinic with her daughter to establish care.   She currently resides in independent living facility.   She has depression and was diagnosed with Alzheimers dementia.   Today she has a flat affect, she reports she has depression, she is not on medication for this. She denies SI. She is not interested in therapy.  She is due for mammogram and colon cancer screening.    Review of Systems       Objective    BP 99/62 (BP Location: Right arm, Patient Position: Sitting, Cuff Size: Adult Regular)   Pulse 78   Temp 97.2  F (36.2  C) (Oral)   Resp 14   Ht 1.702 m (5' 7.01\")   Wt 55.2 kg (121 lb 11.2 oz)   SpO2 99%   BMI 19.06 kg/m    Body mass index is 19.06 kg/m .  Physical Exam               "

## 2023-01-12 ENCOUNTER — DOCUMENTATION ONLY (OUTPATIENT)
Dept: OTHER | Facility: CLINIC | Age: 73
End: 2023-01-12

## 2023-01-16 ENCOUNTER — TELEPHONE (OUTPATIENT)
Dept: FAMILY MEDICINE | Facility: CLINIC | Age: 73
End: 2023-01-16
Payer: MEDICARE

## 2023-01-16 DIAGNOSIS — I83.93 ASYMPTOMATIC VARICOSE VEINS OF BOTH LOWER EXTREMITIES: Primary | ICD-10-CM

## 2023-01-16 NOTE — TELEPHONE ENCOUNTER
Vascular medicine referral     [facility]  [address] [address]  Phone: [phone]  Fax: [phone]

## 2023-01-16 NOTE — TELEPHONE ENCOUNTER
Sister, Hue (c2c) calling to relay pt's daughters concern of vericose veins. Hue states that daughter just noticed them this weekend and asked Hue to call and see if PCP recommends further evaluation.     Pt and sister state that pt has varicose veins for last  20 years. No c/o pain or change in appearance noted per sister or pt. The veins are found on tops of thighs and lower legs.    Sister states these veins have been there for many years, but daughter has recently noticed and asked to make note to PCP for any further recommendations.    She is wondering if she should have a referral to vascular or if anything further should be done.    Please advise.    Nessa CHRISTOPHER RN  EP Triage

## 2023-01-17 ENCOUNTER — TELEPHONE (OUTPATIENT)
Dept: VASCULAR SURGERY | Facility: CLINIC | Age: 73
End: 2023-01-17
Payer: MEDICARE

## 2023-01-17 NOTE — TELEPHONE ENCOUNTER
Left message for pt to return call to clinic and ask for triage.  Deepti Potts, RN  [facility] RN Triage Team

## 2023-01-17 NOTE — TELEPHONE ENCOUNTER
ELIZABETH the below. Patient also currently scheduled at Vein. Does patient need visits at both vascular and vein? Please call Rose, listed below when we get clarification.    Hue Burk (Sister)   [phone]

## 2023-02-09 ENCOUNTER — OFFICE VISIT (OUTPATIENT)
Dept: VASCULAR SURGERY | Facility: CLINIC | Age: 73
End: 2023-02-09
Attending: INTERNAL MEDICINE
Payer: MEDICARE

## 2023-02-09 DIAGNOSIS — I83.93 ASYMPTOMATIC VARICOSE VEINS OF BOTH LOWER EXTREMITIES: ICD-10-CM

## 2023-02-09 PROCEDURE — 99203 OFFICE O/P NEW LOW 30 MIN: CPT | Performed by: SURGERY

## 2023-02-09 RX ORDER — TIMOLOL MALEATE 2.5 MG/ML
1 SOLUTION/ DROPS OPHTHALMIC 2 TIMES DAILY
COMMUNITY
End: 2023-11-17 | Stop reason: ALTCHOICE

## 2023-02-09 RX ORDER — ASPIRIN 81 MG/1
81 TABLET, CHEWABLE ORAL DAILY
Status: ON HOLD | COMMUNITY
End: 2023-12-22

## 2023-02-09 RX ORDER — DONEPEZIL HYDROCHLORIDE 5 MG/1
5 TABLET, FILM COATED ORAL
COMMUNITY
Start: 2022-10-26 | End: 2023-10-26

## 2023-02-09 NOTE — LETTER
2/9/2023         RE: Farida Burk  [address] [address]        Dear Colleague,    Thank you for referring your patient, Farida Burk, to the [facility] [place]. Please see a copy of my visit note below.    Patient Reported symptoms:    Right leg   Heaviness None of the time   Achiness None of the time   Swelling None of the time   Throbbing None of the time   Itching None of the time   Appearance Slightly noticeable   Impact on work/activities no symptoms    Left Leg   Heaviness None of the time   Achiness None of the time   Swelling Most of the time   Throbbing None of the time   Itching None of the time   Appearance Very noticeable   Impact on work/activities no symptoms    After Visit Follow Up  Per pt request, faxed their compression hose Rx to [facility] at [phone].   Pt would like 2 pair(s) of Black/Beige, Closed-toe, Thigh high, 20-30mmhg compression hose. Fax confirmed.    Pt aware they will be shipped to their home address.    Riya Guevara    I had the pleasure of seeing Mrs. Farida Burk in the vein clinic today.  She is a very pleasant 72-year-old female who comes to us for evaluation of left lower extremity varicose veins.  She tells me she has had these for a long time.  When I specifically asked her if she has any symptoms of pain, pruritus, burning, heaviness, throbbing or tiredness she has specifically denied each of these complaints.    She has also not had any episodes of phlebitis.    On my examination there are large ropey varicosities in the left lower extremity starting in the shin area and then alongside the lateral aspect of the knee and then anterior thigh and up to the groin.  There is no evidence of phlebitis.  They are nontender.    Minimal varicose veins on the right lower extremity.    Diagnosis: Asymptomatic left lower extremity varicose veins.    Plan: I explained the pathophysiology of disease to her and her daughter in detail.  It  is possible that she may develop symptoms later on and now she is aware of those potential symptoms.  At this point no specific treatment other than external compression is advised.  She can follow-up as needed.      Again, thank you for allowing me to participate in the care of your patient.        Sincerely,        Ruy Mayes MD

## 2023-02-09 NOTE — PROGRESS NOTES
I had the pleasure of seeing Mrs. Farida Burk in the vein clinic today.  She is a very pleasant 72-year-old female who comes to us for evaluation of left lower extremity varicose veins.  She tells me she has had these for a long time.  When I specifically asked her if she has any symptoms of pain, pruritus, burning, heaviness, throbbing or tiredness she has specifically denied each of these complaints.    She has also not had any episodes of phlebitis.    On my examination there are large ropey varicosities in the left lower extremity starting in the shin area and then alongside the lateral aspect of the knee and then anterior thigh and up to the groin.  There is no evidence of phlebitis.  They are nontender.    Minimal varicose veins on the right lower extremity.    Diagnosis: Asymptomatic left lower extremity varicose veins.    Plan: I explained the pathophysiology of disease to her and her daughter in detail.  It is possible that she may develop symptoms later on and now she is aware of those potential symptoms.  At this point no specific treatment other than external compression is advised.  She can follow-up as needed.

## 2023-02-09 NOTE — PATIENT INSTRUCTIONS
Varicose Veins and Spider Veins    Varicose veins are swollen, enlarged veins most often found in the legs. They are usually blue or purple in color and may bulge, twist, and stand out under the skin. Spider veins are small veins just under your skin that can look red, blue or purple.        Normally, veins return blood from the body to the heart. The leg veins have one-way valves that prevent blood from flowing backward in the vein. When the valves are weak or damaged, blood backs up in the veins. This may cause some of the veins to swell and bulge and become varicose veins.     Symptoms  Varicose veins may or may not cause symptoms. If symptoms do occur, they can include:  Legs that feel tired, achy, heavy, or itchy  Leg swelling  Leg muscle cramps  Skin changes, such as discoloration, dryness, redness, or rash (in more severe cases, you may also have sores on the skin called venous leg ulcers)    Risk factors  There are a number of factors that increase the risk for varicose veins. These can include:   Being a woman  Being older  Sitting or standing for long periods  Being overweight  Being pregnant  Having a family history of varicose veins  Hormones, birth control pills    Treatment starts with simple self-help measures (see below). If these don't help, there are many procedures that can be done to shrink or remove varicose veins. Your healthcare provider can tell you more about these options, if needed.     Home care  Support or compression stockings will likely be prescribed. If so, be sure to wear them as directed. They may help improve blood flow.  Exercising helps strengthen your leg muscles and improve blood flow. To get the most benefit, choose exercises such as walking, swimming, or cycling. Also try to exercise for at least 30 minutes on most days.  Raising your legs above heart level will help relieve swelling and keep blood from pooling in veins. Try to elevate your legs for 15 to 20 minutes at  the end of the day, and whenever you're relaxing. To make sure your legs are raised above heart level, prop them up on cushions or large pillows.  To keep blood moving when you have to sit or stand for long periods, try these tips:  At work, take walking breaks instead of coffee breaks. Walk during your lunch hour. Or try flexing your feet up and down 10 times each hour.  When standing, raise yourself up and down on your toes, or rock back and forth on your heels.  If you are overweight, talk with your healthcare provider about setting up a weight-loss plan. Maintaining a healthy weight can help reduce the strain on your veins. It may also improve symptoms, such as swelling and aching.  If you have dryness and itching, ask your provider about special lotions that can be applied to the skin to help improve symptoms.     Follow-up care  Follow up with your healthcare provider, or as directed. If imaging tests were done, you'll be told the results and if there are any new findings that affect your care.     When to seek medical advice  Call your healthcare provider right away if any of these occur:  Sudden, severe leg swelling, pain, or redness  Symptoms worsen, or they don't improve with self-care  Bleeding from any affected veins  Ulcers form on the legs, ankles, or feet  Fever of 100.4 F (38 C) or higher, or as advised by your provider    [employer] last reviewed this educational content on 4/1/2018 2000-2021 [employer]. All rights reserved. This information is not intended as a substitute for professional medical care. Always follow your healthcare professional's instructions.    Self-Care for Spider and Varicose Veins    Your healthcare provider may suggest that you try self-care. Exercising and maintaining a healthy weight may keep problem veins from getting worse. Wearing elastic stockings and elevating your legs can help improve blood flow. Taking breaks when you sit or stand helps,  too.        Wearing compression stockings  Compression stockings gently squeeze veins so blood flows upward. If you need compression stockings, your healthcare provider can prescribe them for you. Follow your healthcare provider's advice about how and when to wear them. Compression stockings come in several different levels of pressure. Ask your healthcare provider which level of pressure would help you the most.     Raising your legs above heart level will help relieve swelling and keep blood from pooling in veins. Try to elevate your legs for 15 to 20 minutes at the end of the day, and whenever you're relaxing. To make sure your legs are raised above heart level, prop them up on cushions or large pillows.    To keep blood moving when you have to sit or stand for long periods, try these tips:  At work, take walking breaks instead of coffee breaks. Walk during your lunch hour. Or try flexing your feet up and down 10 times each hour.  When standing, raise yourself up and down on your toes, or rock back and forth on your heels.    [employer] last reviewed this educational content on 11/1/2019 2000-2021 [employer]. All rights reserved. This information is not intended as a substitute for professional medical care. Always follow your healthcare professional's instructions.    Treatment Options    Sclerotherapy  Your healthcare provider will inject the vein with a special chemical that will quickly close the vein from the inside. This is particularly useful for spider veins and smaller varicose veins.      If you have large varicose veins, surgery may be the best choice. But it will not prevent new varicose veins from forming. Surgery is most often done in a surgery center or in the office.    If surgery is recommended for you, your surgery will be tailored to your needs. Varicose veins may be tied off (ligation), destroyed, or removed. Blood will then flow through the healthy veins. One or more of the  following techniques may be used:    Ablation (laser or radiofrequency)  A tiny cut in the skin is made near the varicose vein. A small tube called a catheter is inserted into the vein. Energy or heat released from the catheter tip will make the vein walls collapse and stick together, stopping all blood flow through the vein.         Ablation (glue)  A tiny cut in the skin is made near the varicose vein. A small tube called a catheter is inserted into the vein. Droplets of glue are deposited into the vein to make vein walls collapse and stick together stopping blood flow through the vein.    Microphlebectomy or ambulatory phlebectomy  A special hook is used to gently take out a varicose vein through tiny incisions. Microphlebectomy may be done in your healthcare provider's office.      Vein stripping and ligation (rare)  In more severe cases, the surgeon may tie off and remove veins by making smaller cuts in the skin. Smaller branching veins may also be tied off or removed.    Know about the risks  Your healthcare provider will talk with you about the risks of surgery. These include:  Bleeding or swelling  A sense of numbness, burning, or tingling in areas near the procedure  Edema or swelling in the legs  Clots in the deep veins that may travel to the lungs  Infection  Scarring  Inflammation related to the glue    [employer] last reviewed this educational content on 11/1/2019 (Sclerotherapy image) 12/1/2019 (Radiofrequency ablation image, Microphlebectomy image)    4133-7617 [employer]. All rights reserved. This information is not intended as a substitute for professional medical care. Always follow your healthcare professional's instructions.

## 2023-02-09 NOTE — PROGRESS NOTES
Patient Reported symptoms:    Right leg   Heaviness None of the time   Achiness None of the time   Swelling None of the time   Throbbing None of the time   Itching None of the time   Appearance Slightly noticeable   Impact on work/activities no symptoms    Left Leg   Heaviness None of the time   Achiness None of the time   Swelling Most of the time   Throbbing None of the time   Itching None of the time   Appearance Very noticeable   Impact on work/activities no symptoms

## 2023-02-09 NOTE — PROGRESS NOTES
After Visit Follow Up  Per pt request, faxed their compression hose Rx to [facility] at [phone].   Pt would like 2 pair(s) of Black/Beige, Closed-toe, Thigh high, 20-30mmhg compression hose. Fax confirmed.    Pt aware they will be shipped to their home address.    Riya Guevara

## 2023-02-22 ENCOUNTER — NURSE TRIAGE (OUTPATIENT)
Dept: FAMILY MEDICINE | Facility: CLINIC | Age: 73
End: 2023-02-22

## 2023-02-22 ENCOUNTER — OFFICE VISIT (OUTPATIENT)
Dept: FAMILY MEDICINE | Facility: CLINIC | Age: 73
End: 2023-02-22
Payer: MEDICARE

## 2023-02-22 VITALS
TEMPERATURE: 97.1 F | BODY MASS INDEX: 18.9 KG/M2 | RESPIRATION RATE: 16 BRPM | HEART RATE: 69 BPM | HEIGHT: 67 IN | DIASTOLIC BLOOD PRESSURE: 70 MMHG | OXYGEN SATURATION: 98 % | WEIGHT: 120.4 LBS | SYSTOLIC BLOOD PRESSURE: 107 MMHG

## 2023-02-22 DIAGNOSIS — R39.89 URINARY PROBLEM: Primary | ICD-10-CM

## 2023-02-22 DIAGNOSIS — N39.0 URINARY TRACT INFECTION WITHOUT HEMATURIA, SITE UNSPECIFIED: ICD-10-CM

## 2023-02-22 DIAGNOSIS — F32.1 CURRENT MODERATE EPISODE OF MAJOR DEPRESSIVE DISORDER WITHOUT PRIOR EPISODE (H): ICD-10-CM

## 2023-02-22 DIAGNOSIS — Z12.11 SCREEN FOR COLON CANCER: ICD-10-CM

## 2023-02-22 DIAGNOSIS — G30.0 MODERATE EARLY ONSET ALZHEIMER'S DEMENTIA WITH MOOD DISTURBANCE (H): ICD-10-CM

## 2023-02-22 DIAGNOSIS — F02.B3 MODERATE EARLY ONSET ALZHEIMER'S DEMENTIA WITH MOOD DISTURBANCE (H): ICD-10-CM

## 2023-02-22 DIAGNOSIS — E03.8 OTHER SPECIFIED HYPOTHYROIDISM: ICD-10-CM

## 2023-02-22 LAB
ALBUMIN UR-MCNC: NEGATIVE MG/DL
APPEARANCE UR: CLEAR
BACTERIA #/AREA URNS HPF: ABNORMAL /HPF
BILIRUB UR QL STRIP: NEGATIVE
COLOR UR AUTO: YELLOW
GLUCOSE UR STRIP-MCNC: NEGATIVE MG/DL
HGB UR QL STRIP: ABNORMAL
KETONES UR STRIP-MCNC: NEGATIVE MG/DL
LEUKOCYTE ESTERASE UR QL STRIP: ABNORMAL
NITRATE UR QL: POSITIVE
PH UR STRIP: 6 [PH] (ref 5–7)
RBC #/AREA URNS AUTO: ABNORMAL /HPF
SP GR UR STRIP: 1.02 (ref 1–1.03)
TSH SERPL DL<=0.005 MIU/L-ACNC: 1.08 UIU/ML (ref 0.3–4.2)
UROBILINOGEN UR STRIP-ACNC: 0.2 E.U./DL
WBC #/AREA URNS AUTO: ABNORMAL /HPF

## 2023-02-22 PROCEDURE — 87086 URINE CULTURE/COLONY COUNT: CPT | Performed by: INTERNAL MEDICINE

## 2023-02-22 PROCEDURE — 99214 OFFICE O/P EST MOD 30 MIN: CPT | Performed by: INTERNAL MEDICINE

## 2023-02-22 PROCEDURE — 87186 SC STD MICRODIL/AGAR DIL: CPT | Performed by: INTERNAL MEDICINE

## 2023-02-22 PROCEDURE — 36415 COLL VENOUS BLD VENIPUNCTURE: CPT | Performed by: INTERNAL MEDICINE

## 2023-02-22 PROCEDURE — 81001 URINALYSIS AUTO W/SCOPE: CPT | Performed by: INTERNAL MEDICINE

## 2023-02-22 PROCEDURE — 84443 ASSAY THYROID STIM HORMONE: CPT | Performed by: INTERNAL MEDICINE

## 2023-02-22 RX ORDER — SULFAMETHOXAZOLE/TRIMETHOPRIM 800-160 MG
1 TABLET ORAL 2 TIMES DAILY
Qty: 6 TABLET | Refills: 0 | Status: SHIPPED | OUTPATIENT
Start: 2023-02-22 | End: 2023-02-25

## 2023-02-22 ASSESSMENT — PATIENT HEALTH QUESTIONNAIRE - PHQ9
SUM OF ALL RESPONSES TO PHQ QUESTIONS 1-9: 0
10. IF YOU CHECKED OFF ANY PROBLEMS, HOW DIFFICULT HAVE THESE PROBLEMS MADE IT FOR YOU TO DO YOUR WORK, TAKE CARE OF THINGS AT HOME, OR GET ALONG WITH OTHER PEOPLE: NOT DIFFICULT AT ALL
SUM OF ALL RESPONSES TO PHQ QUESTIONS 1-9: 0

## 2023-02-22 ASSESSMENT — PAIN SCALES - GENERAL: PAINLEVEL: MODERATE PAIN (5)

## 2023-02-22 NOTE — PROGRESS NOTES
Assessment & Plan     Urinary problem  - UA macro with reflex to Microscopic and Culture - Clinc Collect  - Urine Microscopic Exam  - Urine Culture    Other specified hypothyroidism  - TSH WITH FREE T4 REFLEX    Moderate early onset Alzheimer's dementia with mood disturbance (H)  Stable. Continue donepezil 5 mg daily.    Current moderate episode of major depressive disorder without prior episode (H)  Stable. Not on medication or therapy.    Urinary tract infection without hematuria, site unspecified  - sulfamethoxazole-trimethoprim (BACTRIM DS) 800-160 MG tablet; Take 1 tablet by mouth 2 times daily for 3 days    Screen for colon cancer  - Fecal colorectal cancer screen (FIT)    See Patient Instructions    Return in about 3 months (around 5/22/2023) for Follow up, Routine preventive, with me.    ALLI CUEVAS MD  [facility] [place]    Staci Iqbal is a 72 year old, presenting for the following health issues:  UTI    Farida is here for urinary symptoms.  UA is positive for UTI.  She had a UTI 6 months ago which was treated with Omnicef.    She currently resides in an independent senior living.  She notices that her memory is very slowly getting worse.  She is completely functional and independent for ADLs.  Currently on Aricept 5 mg daily.    Mood is good.  She did not establish with a therapist.  She has varicose veins and was seen by vascular surgery who recommended compression stockings she has questions about when to use the stockings and how they help with varicose veins.  We had a detailed discussion.      History of Present Illness       Reason for visit:  Uti  Symptom onset:  1-3 days ago  Symptoms include:  Frquent painful urination  Symptom intensity:  Moderate  Symptom progression:  Staying the same  Had these symptoms before:  Yes  Has tried/received treatment for these symptoms:  Yes  Previous treatment was successful:  Yes  Prior treatment description:  Antibiotic    She eats 2-3  "servings of fruits and vegetables daily.She consumes 1 sweetened beverage(s) daily.She exercises with enough effort to increase her heart rate 30 to 60 minutes per day.  She exercises with enough effort to increase her heart rate 5 days per week.   She is taking medications regularly.    Today's PHQ-9         PHQ-9 Total Score: 0    PHQ-9 Q9 Thoughts of better off dead/self-harm past 2 weeks :   Not at all    How difficult have these problems made it for you to do your work, take care of things at home, or get along with other people: Not difficult at all     Review of Systems       Objective    /70 (BP Location: Right arm, Patient Position: Sitting, Cuff Size: Adult Small)   Pulse 69   Temp 97.1  F (36.2  C) (Oral)   Resp 16   Ht 1.702 m (5' 7.01\")   Wt 54.6 kg (120 lb 6.4 oz)   SpO2 98%   BMI 18.85 kg/m    Body mass index is 18.85 kg/m .  Physical Exam           "

## 2023-02-22 NOTE — TELEPHONE ENCOUNTER
"Scheduled for office visit today    Reason for Disposition    Age > 50 years    Additional Information    Negative: Shock suspected (e.g., cold/pale/clammy skin, too weak to stand, low BP, rapid pulse)    Negative: Sounds like a life-threatening emergency to the triager    Negative: Unable to urinate (or only a few drops) and bladder feels very full    Negative: Vomiting    Negative: Patient sounds very sick or weak to the triager    Negative: SEVERE pain with urination    Negative: Fever > 100.4 F (38.0 C)    Negative: Side (flank) or lower back pain present    Negative: Patient is worried they have a sexually transmitted infection (STI)    Negative: > 2 UTIs in last year    Negative: Unusual vaginal discharge    Negative: Taking antibiotic > 3 days for UTI and painful urination not improved    Negative: Taking antibiotic > 24 hours for UTI and fever persists    Answer Assessment - Initial Assessment Questions  1. SEVERITY: \"How bad is the pain?\"  (e.g., Scale 1-10; mild, moderate, or severe)    - MILD (1-3): complains slightly about urination hurting    - MODERATE (4-7): interferes with normal activities      - SEVERE (8-10): excruciating, unwilling or unable to urinate because of the pain       No pain just burning  2. FREQUENCY: \"How many times have you had painful urination today?\"       everytime  3. PATTERN: \"Is pain present every time you urinate or just sometimes?\"       yes  4. ONSET: \"When did the painful urination start?\"       5 days ago  5. FEVER: \"Do you have a fever?\" If Yes, ask: \"What is your temperature, how was it measured, and when did it start?\"      no  6. PAST UTI: \"Have you had a urine infection before?\" If Yes, ask: \"When was the last time?\" and \"What happened that time?\"       Yes a while ago   7. CAUSE: \"What do you think is causing the painful urination?\"  (e.g., UTI, scratch, Herpes sore)      UTI  8. OTHER SYMPTOMS: \"Do you have any other symptoms?\" (e.g., flank pain, vaginal " "discharge, genital sores, urgency, blood in urine)      Urgency   9. PREGNANCY: \"Is there any chance you are pregnant?\" \"When was your last menstrual period?\"      no    Protocols used: URINATION PAIN - FEMALE-A-OH      "

## 2023-02-23 LAB — BACTERIA UR CULT: ABNORMAL

## 2023-02-24 ENCOUNTER — TELEPHONE (OUTPATIENT)
Dept: FAMILY MEDICINE | Facility: CLINIC | Age: 73
End: 2023-02-24
Payer: MEDICARE

## 2023-02-24 RX ORDER — LEVOTHYROXINE SODIUM 50 UG/1
50 CAPSULE ORAL AT BEDTIME
Status: CANCELLED
Start: 2023-02-24

## 2023-02-24 NOTE — TELEPHONE ENCOUNTER
Pt's sister Hue called and she is on CTC. She would like to get proxy access to pt's MyChart. Nurse provided MyChart assistance number to help with setting up proxy access. She had no further questions or concerns at this time.     Effie GROSSMAN RN  [facility]

## 2023-02-24 NOTE — TELEPHONE ENCOUNTER
TO PCP:     Pt's daughter on C2C called      shows susceptibility to Bactrim - asking if only needing to take the 3 days that were prescribed or if any further abx are needed? She has not spoken with patient today but yesterday was feeling a little better    Also - asking if any adjustments needed on Levothyroxine. Dosage is not listed on med list, prior Rx from former PCP Angela Johnson - takes 50mcg Levothyroxine daily. Pended Rx as no print out/no new Rx needed unless adjustment in dosage at this time     Linnea MCKEON, Triage RN  [facility]

## 2023-03-08 ENCOUNTER — TELEPHONE (OUTPATIENT)
Dept: VASCULAR SURGERY | Facility: CLINIC | Age: 73
End: 2023-03-08
Payer: MEDICARE

## 2023-03-08 NOTE — TELEPHONE ENCOUNTER
Per pt request her hose rx was faxed to [facility] on 2/9/23. Fadumo at [facility] replied to me about the hose status:      Explained to pt's sister Hue about above; gave measurements of pt's legs. They will order the hose online and f/u prn.

## 2023-03-09 ENCOUNTER — TELEPHONE (OUTPATIENT)
Dept: OTHER | Facility: CLINIC | Age: 73
End: 2023-03-09
Payer: MEDICARE

## 2023-03-09 NOTE — TELEPHONE ENCOUNTER
Patient sister Hue calling in regards to ordering compression hose for her sister. Planning on ordering online due to insurance not covering. Hue wondering what strength and recommended brands. Provided Hue with information. She verbalized understanding. No further questions at this time.

## 2023-03-09 NOTE — TELEPHONE ENCOUNTER
Was uncertain who her sister saw, but is trying to order compression hose and still has questions.     Needs compression level and wondering if needs a specific brand   Please call  Cell at  [phone]

## 2023-06-04 ENCOUNTER — OFFICE VISIT (OUTPATIENT)
Dept: URGENT CARE | Facility: URGENT CARE | Age: 73
End: 2023-06-04
Payer: MEDICARE

## 2023-06-04 VITALS
DIASTOLIC BLOOD PRESSURE: 75 MMHG | SYSTOLIC BLOOD PRESSURE: 113 MMHG | HEART RATE: 65 BPM | TEMPERATURE: 98.2 F | WEIGHT: 122.2 LBS | OXYGEN SATURATION: 99 % | BODY MASS INDEX: 19.13 KG/M2 | RESPIRATION RATE: 16 BRPM

## 2023-06-04 DIAGNOSIS — R30.0 DYSURIA: Primary | ICD-10-CM

## 2023-06-04 LAB
ALBUMIN UR-MCNC: NEGATIVE MG/DL
APPEARANCE UR: CLEAR
BACTERIA #/AREA URNS HPF: ABNORMAL /HPF
BILIRUB UR QL STRIP: NEGATIVE
COLOR UR AUTO: YELLOW
GLUCOSE UR STRIP-MCNC: NEGATIVE MG/DL
HGB UR QL STRIP: NEGATIVE
KETONES UR STRIP-MCNC: NEGATIVE MG/DL
LEUKOCYTE ESTERASE UR QL STRIP: NEGATIVE
NITRATE UR QL: NEGATIVE
PH UR STRIP: 6.5 [PH] (ref 5–7)
RBC #/AREA URNS AUTO: ABNORMAL /HPF
SP GR UR STRIP: 1.01 (ref 1–1.03)
UROBILINOGEN UR STRIP-ACNC: 0.2 E.U./DL
WBC #/AREA URNS AUTO: ABNORMAL /HPF

## 2023-06-04 PROCEDURE — 87086 URINE CULTURE/COLONY COUNT: CPT | Performed by: FAMILY MEDICINE

## 2023-06-04 PROCEDURE — 81001 URINALYSIS AUTO W/SCOPE: CPT | Performed by: FAMILY MEDICINE

## 2023-06-04 PROCEDURE — 99213 OFFICE O/P EST LOW 20 MIN: CPT | Performed by: FAMILY MEDICINE

## 2023-06-04 RX ORDER — PHENAZOPYRIDINE HYDROCHLORIDE 200 MG/1
200 TABLET, FILM COATED ORAL 3 TIMES DAILY PRN
Qty: 6 TABLET | Refills: 0 | Status: SHIPPED | OUTPATIENT
Start: 2023-06-04 | End: 2023-11-17

## 2023-06-04 NOTE — PROGRESS NOTES
Farida Burk is a 72 year old female who comes in today for urinary symptoms x 4 days now.      Symptoms staying same    Pain and burning on urination    Several  uti  Per  Year      No blood in urine     No fever/ chills    No back or flank pain      No vaginal discharge    No symptoms during     No change in diet    Usually urinates  Every 3 hours     No rash in vaginal area        Full physical not done     Mentation and affect are fine    No tremor of speech or extremity    No costovertebral angle tenderness    abd soft nonender     ua not suspicious for uti    ASSESSMENT / PLAN:  (R30.0) Dysuria  (primary encounter diagnosis)  Comment: no antibiotics at this point. Will send culture just to be sure. May be some bladder spasm.  Can use pyridium prn for a couple days, warned of discolored urine on this.   Plan: UA with Microscopic reflex to Culture - Clinic         Collect, UA Microscopic with Reflex to Culture,        phenazopyridine (PYRIDIUM) 200 MG tablet, Urine        Culture Aerobic Bacterial - lab collect           Follow up as needed based on symptoms         I reviewed the patient's medications, allergies, medical history, family history, and social history.    Horace Sanchez MD

## 2023-06-04 NOTE — PATIENT INSTRUCTIONS
Hold  off on antibiotics    We will  notify you if bacteria seen on culture    Stay well hydrated    Use the pyridium as needed for a couple days    Follow up  in clnic  if  symptoms not resolving

## 2023-06-06 LAB — BACTERIA UR CULT: NO GROWTH

## 2023-06-07 ENCOUNTER — OFFICE VISIT (OUTPATIENT)
Dept: URGENT CARE | Facility: URGENT CARE | Age: 73
End: 2023-06-07
Payer: MEDICARE

## 2023-06-07 DIAGNOSIS — R23.8 SKIN IRRITATION: ICD-10-CM

## 2023-06-07 DIAGNOSIS — R30.0 DYSURIA: ICD-10-CM

## 2023-06-07 DIAGNOSIS — N39.0 ACUTE UTI: Primary | ICD-10-CM

## 2023-06-07 LAB
BACTERIA #/AREA URNS HPF: ABNORMAL /HPF
CLUE CELLS: ABNORMAL
RBC #/AREA URNS AUTO: ABNORMAL /HPF
SQUAMOUS #/AREA URNS AUTO: ABNORMAL /LPF
TRICHOMONAS, WET PREP: ABNORMAL
WBC #/AREA URNS AUTO: ABNORMAL /HPF
WBC CLUMPS #/AREA URNS HPF: PRESENT /HPF
WBC'S/HIGH POWER FIELD, WET PREP: ABNORMAL
YEAST, WET PREP: ABNORMAL

## 2023-06-07 PROCEDURE — 87210 SMEAR WET MOUNT SALINE/INK: CPT | Performed by: PHYSICIAN ASSISTANT

## 2023-06-07 PROCEDURE — 99214 OFFICE O/P EST MOD 30 MIN: CPT | Performed by: PHYSICIAN ASSISTANT

## 2023-06-07 PROCEDURE — 87086 URINE CULTURE/COLONY COUNT: CPT | Performed by: PHYSICIAN ASSISTANT

## 2023-06-07 PROCEDURE — 87186 SC STD MICRODIL/AGAR DIL: CPT | Performed by: PHYSICIAN ASSISTANT

## 2023-06-07 PROCEDURE — 81015 MICROSCOPIC EXAM OF URINE: CPT | Performed by: PHYSICIAN ASSISTANT

## 2023-06-07 RX ORDER — CEFDINIR 300 MG/1
300 CAPSULE ORAL 2 TIMES DAILY
Qty: 14 CAPSULE | Refills: 0 | Status: SHIPPED | OUTPATIENT
Start: 2023-06-07 | End: 2023-06-14

## 2023-06-07 NOTE — PROGRESS NOTES
(N39.0) Acute UTI  (primary encounter diagnosis)  Comment:   Plan: cefdinir (OMNICEF) 300 MG capsule            (R30.0) Dysuria  Comment:   Plan: UA Microscopic with Reflex to Culture, Urine         Culture, Wet prep - Clinic Collect, CANCELED:         UA Macroscopic with reflex to Microscopic and         Culture        (R23.8) Skin irritation  Comment: likely secondary to incontinence  Plan:  Use aquaphor to skin in perineum as a barrier    Follow up with primary clinic should symptoms persist or worsen      You may also benefit from following up with Urology given your recurrent UTI's    31 minutes spent by me on the date of the encounter doing chart review, review of test results, interpretation of tests, patient visit, documentation and discussion with family       SUBJECTIVE:   Farida Burk is a 72 year old female who presents today with persistent burning with urination and urinary frequency onset about a week ago.      She was seen in clinic on June 4, urinalysis at that time was essentially normal with a negative urine culture as well.    No fevers.  No back pain.  Denies any sores in perineum, but area feels sore.      Denies any abdominal pain.    SH: She is here today with her sister who also helps with the history.        Past Medical History:   Diagnosis Date     Alzheimer's disease (H)     eval at [place]     Other specified hypothyroidism          Current Outpatient Medications   Medication Sig Dispense Refill     Multiple Vitamins-Iron (DAILY-JOSE/IRON/BETA-CAROTENE) TABS TAKE 1 TABLET BY MOUTH DAILY. (Patient not taking: Reported on 10/19/2020) 30 tablet 7     Social History     Tobacco Use     Smoking status: Never Smoker     Smokeless tobacco: Never Used   Substance Use Topics     Alcohol use: Not on file     Family History   Problem Relation Age of Onset     Diabetes Mother      Diabetes Father          ROS:    10 point ROS of systems including Constitutional, Eyes, Respiratory, Cardiovascular,  Gastroenterology, Genitourinary, Integumentary, Muscularskeletal, Psychiatric ,neurological were all negative except for pertinent positives noted in my HPI       OBJECTIVE:  There were no vitals taken for this visit.  Physical Exam:  GENERAL APPEARANCE: healthy, alert and no distress  ABDOMEN:  soft, nontender, no HSM or masses and bowel sounds normal  GU_female: vagina normal without significant discharge.  Urine present at urethra.  Labia minorae and majorae are subtly erythematous without open sores or vesicles.    SKIN: Erythema in perineum as described.  No discharge or drainage.    Results for orders placed or performed in visit on 06/07/23   UA Microscopic with Reflex to Culture     Status: Abnormal   Result Value Ref Range    Bacteria Urine Many (A) None Seen /HPF    RBC Urine 0-2 0-2 /HPF /HPF    WBC Urine  (A) 0-5 /HPF /HPF    Squamous Epithelials Urine Few (A) None Seen /LPF    WBC Clumps Urine Present (A) None Seen /HPF   Urine Culture     Status: Abnormal (Preliminary result)    Specimen: Urine, Clean Catch   Result Value Ref Range    Culture >100,000 CFU/mL Gram negative bacilli (A)    Wet prep - Clinic Collect     Status: Abnormal    Specimen: Vagina; Swab   Result Value Ref Range    Trichomonas Absent Absent    Yeast Absent Absent    Clue Cells Absent Absent    WBCs/high power field 2+ (A) None

## 2023-06-07 NOTE — PATIENT INSTRUCTIONS
(N39.0) Acute UTI  (primary encounter diagnosis)  Comment:   Plan: cefdinir (OMNICEF) 300 MG capsule            (R30.0) Dysuria  Comment:   Plan: UA Microscopic with Reflex to Culture, Urine         Culture, Wet prep - Clinic Collect, CANCELED:         UA Macroscopic with reflex to Microscopic and         Culture            Use aquaphor to skin in perineum     Follow up with primary clinic should symptoms persist or worsen      You may also benefit from following up with Urology given your recurrent UTI's

## 2023-06-08 LAB — BACTERIA UR CULT: ABNORMAL

## 2023-07-24 ENCOUNTER — TELEPHONE (OUTPATIENT)
Dept: FAMILY MEDICINE | Facility: CLINIC | Age: 73
End: 2023-07-24
Payer: MEDICARE

## 2023-07-24 DIAGNOSIS — F02.80 ALZHEIMER'S DISEASE (H): Primary | ICD-10-CM

## 2023-07-24 DIAGNOSIS — G30.9 ALZHEIMER'S DISEASE (H): Primary | ICD-10-CM

## 2023-07-24 NOTE — TELEPHONE ENCOUNTER
Daughter requesting out patient OT to eval and treat baseline cognitive assessment.     Fax referral: [phone].    Daughter is very anxious to start.  Aundrea Cervantes RN

## 2023-07-25 ENCOUNTER — MEDICAL CORRESPONDENCE (OUTPATIENT)
Dept: HEALTH INFORMATION MANAGEMENT | Facility: CLINIC | Age: 73
End: 2023-07-25

## 2023-07-25 NOTE — TELEPHONE ENCOUNTER
I was under the impression that she is seeing neurology for her symptoms of dementia and cognitive decline. Are her symptoms worsening recently? I can refer her to memory clinic and they also assess cognition.

## 2023-07-26 ENCOUNTER — TELEPHONE (OUTPATIENT)
Dept: FAMILY MEDICINE | Facility: CLINIC | Age: 73
End: 2023-07-26
Payer: MEDICARE

## 2023-07-26 NOTE — TELEPHONE ENCOUNTER
I faxed.  I crossed off the [employer] [facility] and manually wrote [facility]  on printout.      Jennifer KNIGHT MA

## 2023-07-26 NOTE — TELEPHONE ENCOUNTER
[facility] OT calling to check on status of requested orders. RN relayed provider recommendations to OT along with provider questions. OT stated family is requesting [facility] perform an OT eval and treat to assess for baseline cognition due to [facility] being located in patient's living facility.      Routing to PCP as update.     [facility] Fax Number: [phone]. Patient currently resides at [facility].

## 2023-07-31 ENCOUNTER — MEDICAL CORRESPONDENCE (OUTPATIENT)
Dept: HEALTH INFORMATION MANAGEMENT | Facility: CLINIC | Age: 73
End: 2023-07-31

## 2023-10-12 ENCOUNTER — MEDICAL CORRESPONDENCE (OUTPATIENT)
Dept: HEALTH INFORMATION MANAGEMENT | Facility: CLINIC | Age: 73
End: 2023-10-12

## 2023-10-23 ENCOUNTER — HOSPITAL ENCOUNTER (OUTPATIENT)
Dept: CT IMAGING | Facility: CLINIC | Age: 73
Discharge: HOME | End: 2023-10-23
Attending: INTERNAL MEDICINE | Admitting: INTERNAL MEDICINE
Payer: MEDICARE

## 2023-10-23 DIAGNOSIS — M81.0 OSTEOPOROSIS: ICD-10-CM

## 2023-10-23 DIAGNOSIS — E03.9 HYPOTHYROIDISM: ICD-10-CM

## 2023-10-23 DIAGNOSIS — E21.3 HYPERPARATHYROIDISM (H): ICD-10-CM

## 2023-10-23 LAB
CREAT BLD-MCNC: 0.6 MG/DL (ref 0.5–1)
EGFRCR SERPLBLD CKD-EPI 2021: >60 ML/MIN/1.73M2

## 2023-10-23 PROCEDURE — 250N000009 HC RX 250: Performed by: INTERNAL MEDICINE

## 2023-10-23 PROCEDURE — 70492 CT SFT TSUE NCK W/O & W/DYE: CPT

## 2023-10-23 PROCEDURE — 82565 ASSAY OF CREATININE: CPT

## 2023-10-23 PROCEDURE — 250N000011 HC RX IP 250 OP 636: Performed by: INTERNAL MEDICINE

## 2023-10-23 RX ORDER — IOPAMIDOL 755 MG/ML
67 INJECTION, SOLUTION INTRAVASCULAR ONCE
Status: COMPLETED | OUTPATIENT
Start: 2023-10-23 | End: 2023-10-23

## 2023-10-23 RX ADMIN — IOPAMIDOL 67 ML: 755 INJECTION, SOLUTION INTRAVENOUS at 07:37

## 2023-10-23 RX ADMIN — SODIUM CHLORIDE 80 ML: 9 INJECTION, SOLUTION INTRAVENOUS at 07:38

## 2023-10-24 ENCOUNTER — TRANSFERRED RECORDS (OUTPATIENT)
Dept: SURGERY | Facility: CLINIC | Age: 73
End: 2023-10-24
Payer: MEDICARE

## 2023-11-17 ENCOUNTER — OFFICE VISIT (OUTPATIENT)
Dept: SURGERY | Facility: CLINIC | Age: 73
End: 2023-11-17
Payer: MEDICARE

## 2023-11-17 VITALS
HEIGHT: 67 IN | WEIGHT: 122 LBS | BODY MASS INDEX: 19.15 KG/M2 | SYSTOLIC BLOOD PRESSURE: 98 MMHG | RESPIRATION RATE: 16 BRPM | HEART RATE: 63 BPM | OXYGEN SATURATION: 98 % | DIASTOLIC BLOOD PRESSURE: 68 MMHG

## 2023-11-17 DIAGNOSIS — E21.3 HYPERPARATHYROIDISM (H): Primary | ICD-10-CM

## 2023-11-17 PROCEDURE — 99204 OFFICE O/P NEW MOD 45 MIN: CPT | Performed by: SURGERY

## 2023-11-17 RX ORDER — DONEPEZIL HYDROCHLORIDE 5 MG/1
5 TABLET, FILM COATED ORAL AT BEDTIME
COMMUNITY

## 2023-11-17 RX ORDER — LATANOPROST 50 UG/ML
1 SOLUTION/ DROPS OPHTHALMIC DAILY
COMMUNITY
Start: 2023-10-13

## 2023-11-17 RX ORDER — PHENOL 1.4 %
1 AEROSOL, SPRAY (ML) MUCOUS MEMBRANE DAILY
Status: ON HOLD | COMMUNITY
End: 2023-12-22

## 2023-11-20 ENCOUNTER — TELEPHONE (OUTPATIENT)
Dept: SURGERY | Facility: CLINIC | Age: 73
End: 2023-11-20
Payer: MEDICARE

## 2023-11-20 NOTE — TELEPHONE ENCOUNTER
Miles RN did patient's home care evaluation today.  Looking to get skilled nursing weekly x 4 weeks to assess and educate on heart failure, diabetes, pain medications and UTI.    Also patient is using her oxygen prn but it's ordered 2 L continuous. Nurse wondering if they can get order changed to 1.5 L as needed at night.    Routing to provider to advise.  Shelby PRINCEN, RN         Type of surgery: PARATHYROIDECTOMY, POSSIBLE CERVICAL EXPLORATION  Location of surgery: [facility]  Date and time of surgery: 12/22/2023 @ 12:30 PM   Surgeon: Catie Chávez MD   Pre-Op Appt Date: PATIENT TO SCHEDULE    Post-Op Appt Date: PATIENT TO SCHEDULE     Packet sent out: Yes  Pre-cert/Authorization completed:  Not Applicable  Date: 11/20/2023         PARATHYROIDECTOMY, POSSIBLE CERVICAL EXPLORATION GENERAL PT INST TO HAVE H&p WITH  MIN REQ PA ASSIST [facility] NMS - PLEASE CALL DAUGHTER BOBBI [phone]

## 2023-11-28 NOTE — PROGRESS NOTES
History of Present Illness:  Farida Burk is a 73 year old female who is sent by Dr. Katharina Alfaro for evaluation of hypercalcemia/hyperparathyroidism. She had elevated calcium found on screening bloodwork.  She has a calcium level as high as 10.7 (08/07/23).   She has had the elevated calcium for at least 3 months.   She has constitutional symptoms of fatigue, difficulty concentrating, and difficulty with memory, though she also carries a diagnosis of Alzheimer's.  She has no history of kidney stones.   She has not had bony fractures. A DEXA scan shows significant osteoporosis with her last T-score -3.4 in the spine.   There is no family history of parathyroid disease.  Farida has no prior neck surgery.  She is on thyroid medications (50 mcg of levothyroxine daily).    Past Medical History:   Diagnosis Date    Alzheimer's disease (H)     eval at [place]    Other specified hypothyroidism      Past Surgical History:   Procedure Laterality Date    CATARACT EXTRACTION       No Known Allergies  Social History     Socioeconomic History    Marital status:      Spouse name: Not on file    Number of children: 1    Years of education: Not on file    Highest education level: Not on file   Occupational History    Not on file   Tobacco Use    Smoking status: Never     Passive exposure: Never    Smokeless tobacco: Never   Vaping Use    Vaping Use: Never used   Substance and Sexual Activity    Alcohol use: Not Currently     Comment: sober since many years    Drug use: Not Currently    Sexual activity: Not on file   Other Topics Concern    Not on file   Social History Narrative    Not on file     Social Determinants of Health     Financial Resource Strain: Not on file   Food Insecurity: Not on file   Transportation Needs: Not on file   Physical Activity: Not on file   Stress: Not on file   Social Connections: Not on file   Interpersonal Safety: Not on file   Housing Stability: Not on file       Review of Systems:  10  "point ROS is negative except as stated in the History of the Present Illness.    Physical Exam:  BP 98/68   Pulse 63   Resp 16   Ht 1.702 m (5' 7.01\")   Wt 55.3 kg (122 lb)   SpO2 98%   BMI 19.10 kg/m    Well developed, well nourished female in no apparent distress.  HEENT:  Normocephalic, atraumatic.  Neck:   Normal appearance.              Range of motion is normal.              No neck masses.  Thyroid:  Palpably normal.  Lymph:  No cervical adenopathy.  Respirations:  Unlabored.  Neurologic:  Alert.  Speech is clear.  Moves all extremities with good strength.  Skin:  Warm, dry and no rash.  Psychologic:  Alert, appropriate range of emotions.    Labs:  Calcium -10.1  PTH -97.6  24 hour Urinary calcium -not performed    Imaging:  All imaging personally reviewed with Farida   4D CT Scan:    Narrative & Impression   CT OF THE NECK WITHOUT AND WITH CONTRAST  10/23/2023 8:06 AM      COMPARISON: None     HISTORY: 4D CT Neck; Hyperparathyroidism (H24); Hypothyroidism;  Osteoporosis     TECHNIQUE:  Axial CT images of the neck were acquired before and after  the intravenous administration of 67 mL Isovue-370 nonionic iodinated  contrast material. Coronal reconstructions were created.     FINDINGS: There is an ovoid 6 mm soft tissue density nodule posterior  to the right lobe of the thyroid gland that demonstrates greater than  thyroid level enhancement on the arterial images and greater than  thyroid washout on the venous phase images. This is compatible with a  parathyroid adenoma. No other suspicious soft tissue density nodules  are noted in the neck.     No fluid collections or abscesses in the neck. The salivary glands are  unremarkable. No evidence for mucosal space lesion.     There are multiple tiny scattered foci of hypodensity throughout both  lobes of the thyroid gland that most likely represents multiple tiny  thyroid nodules although inflammation of the thyroid gland can have  similar appearance.     No " "sinusitis or mastoiditis. The lung apices are clear.                                                                      IMPRESSION:  1. Ovoid 6 mm soft tissue density nodule posterior to the right lobe  of the thyroid gland with an enhancement pattern suggestive of a  parathyroid adenoma.  2. Multiple foci of hypodensity scattered throughout both lobes of the  thyroid gland that could represent multiple tiny thyroid nodules  versus thyroiditis.  3. Otherwise, normal soft tissue neck CT.        Radiation dose for this scan was reduced using automated exposure  control, adjustment of the mA and/or kV according to patient size, or  iterative reconstruction technique        Assessment and Plan:  Farida has primary hyperparathyroidism and is a candidate for surgery.  We discussed the possibility of single adenoma (85%) vs dual adenoma or hyperplasia (15%).  The imaging studies would suggest that there is a good probability of a single adenoma on the right.  I recommend a focused neck exploration with rapid PTH assay to assess the completeness of the procedure.  Farida is aware of the risks to the recurrent laryngeal nerve and the risk of hypocalcemia, particularly with treatment of  parathyroid hyperplasia.  She is also aware of the 1-2 % risk of \"failure to cure\".  She would like to proceed with surgery in the near future.     Catie Chávez MD    Please route clinic note to:  Primary Care Provider (PCP) and Referring Provider.      45 minutes total time spent on the date of this encounter doing: chart review, review of test results, patient visit, physical exam, education, counseling, developing plan of care, and documenting.      .           "

## 2023-12-08 ENCOUNTER — TRANSFERRED RECORDS (OUTPATIENT)
Dept: SURGERY | Facility: CLINIC | Age: 73
End: 2023-12-08
Payer: MEDICARE

## 2023-12-13 RX ORDER — CALCIUM CARBONATE/VITAMIN D3 600 MG-10
1 TABLET ORAL
COMMUNITY

## 2023-12-22 ENCOUNTER — ANESTHESIA (OUTPATIENT)
Dept: SURGERY | Facility: CLINIC | Age: 73
End: 2023-12-22
Payer: MEDICARE

## 2023-12-22 ENCOUNTER — HOSPITAL ENCOUNTER (OUTPATIENT)
Facility: CLINIC | Age: 73
Discharge: HOME | End: 2023-12-22
Attending: SURGERY | Admitting: SURGERY
Payer: MEDICARE

## 2023-12-22 ENCOUNTER — APPOINTMENT (OUTPATIENT)
Dept: SURGERY | Facility: PHYSICIAN GROUP | Age: 73
End: 2023-12-22
Payer: MEDICARE

## 2023-12-22 ENCOUNTER — ANESTHESIA EVENT (OUTPATIENT)
Dept: SURGERY | Facility: CLINIC | Age: 73
End: 2023-12-22
Payer: MEDICARE

## 2023-12-22 VITALS
SYSTOLIC BLOOD PRESSURE: 98 MMHG | OXYGEN SATURATION: 95 % | TEMPERATURE: 98 F | BODY MASS INDEX: 19.06 KG/M2 | HEIGHT: 66 IN | HEART RATE: 74 BPM | DIASTOLIC BLOOD PRESSURE: 67 MMHG | WEIGHT: 118.6 LBS | RESPIRATION RATE: 15 BRPM

## 2023-12-22 DIAGNOSIS — E21.3 HYPERPARATHYROIDISM (H): ICD-10-CM

## 2023-12-22 LAB
PTH-INTACT SERPL-MCNC: 34 PG/ML (ref 15–65)
PTH-INTACT SERPL-MCNC: 59 PG/ML (ref 15–65)
PTH-INTACT SERPL-MCNC: 68 PG/ML (ref 15–65)

## 2023-12-22 PROCEDURE — 83970 ASSAY OF PARATHORMONE: CPT | Performed by: SURGERY

## 2023-12-22 PROCEDURE — 710N000012 HC RECOVERY PHASE 2, PER MINUTE: Performed by: SURGERY

## 2023-12-22 PROCEDURE — 360N000076 HC SURGERY LEVEL 3, PER MIN: Performed by: SURGERY

## 2023-12-22 PROCEDURE — 60500 EXPLORE PARATHYROID GLANDS: CPT | Mod: AS | Performed by: PHYSICIAN ASSISTANT

## 2023-12-22 PROCEDURE — 250N000011 HC RX IP 250 OP 636: Performed by: SURGERY

## 2023-12-22 PROCEDURE — 250N000009 HC RX 250: Performed by: NURSE ANESTHETIST, CERTIFIED REGISTERED

## 2023-12-22 PROCEDURE — 250N000011 HC RX IP 250 OP 636: Performed by: NURSE ANESTHETIST, CERTIFIED REGISTERED

## 2023-12-22 PROCEDURE — 60500 EXPLORE PARATHYROID GLANDS: CPT | Mod: RT | Performed by: SURGERY

## 2023-12-22 PROCEDURE — 272N000001 HC OR GENERAL SUPPLY STERILE: Performed by: SURGERY

## 2023-12-22 PROCEDURE — 370N000017 HC ANESTHESIA TECHNICAL FEE, PER MIN: Performed by: SURGERY

## 2023-12-22 PROCEDURE — 250N000009 HC RX 250: Performed by: SURGERY

## 2023-12-22 PROCEDURE — 258N000003 HC RX IP 258 OP 636: Performed by: ANESTHESIOLOGY

## 2023-12-22 PROCEDURE — 36415 COLL VENOUS BLD VENIPUNCTURE: CPT | Performed by: SURGERY

## 2023-12-22 PROCEDURE — 250N000011 HC RX IP 250 OP 636: Performed by: ANESTHESIOLOGY

## 2023-12-22 PROCEDURE — 999N000141 HC STATISTIC PRE-PROCEDURE NURSING ASSESSMENT: Performed by: SURGERY

## 2023-12-22 PROCEDURE — 258N000003 HC RX IP 258 OP 636: Performed by: NURSE ANESTHETIST, CERTIFIED REGISTERED

## 2023-12-22 PROCEDURE — 250N000025 HC SEVOFLURANE, PER MIN: Performed by: SURGERY

## 2023-12-22 PROCEDURE — 710N000009 HC RECOVERY PHASE 1, LEVEL 1, PER MIN: Performed by: SURGERY

## 2023-12-22 PROCEDURE — 88331 PATH CONSLTJ SURG 1 BLK 1SPC: CPT | Mod: TC | Performed by: SURGERY

## 2023-12-22 RX ORDER — ONDANSETRON 4 MG/1
4 TABLET, ORALLY DISINTEGRATING ORAL EVERY 30 MIN PRN
Status: DISCONTINUED | OUTPATIENT
Start: 2023-12-22 | End: 2023-12-22 | Stop reason: HOSPADM

## 2023-12-22 RX ORDER — OXYCODONE HYDROCHLORIDE 5 MG/1
5 TABLET ORAL
Status: DISCONTINUED | OUTPATIENT
Start: 2023-12-22 | End: 2023-12-22 | Stop reason: HOSPADM

## 2023-12-22 RX ORDER — KETOROLAC TROMETHAMINE 15 MG/ML
15 INJECTION, SOLUTION INTRAMUSCULAR; INTRAVENOUS
Status: COMPLETED | OUTPATIENT
Start: 2023-12-22 | End: 2023-12-22

## 2023-12-22 RX ORDER — MAGNESIUM HYDROXIDE 1200 MG/15ML
LIQUID ORAL PRN
Status: DISCONTINUED | OUTPATIENT
Start: 2023-12-22 | End: 2023-12-22 | Stop reason: HOSPADM

## 2023-12-22 RX ORDER — SODIUM CHLORIDE, SODIUM LACTATE, POTASSIUM CHLORIDE, CALCIUM CHLORIDE 600; 310; 30; 20 MG/100ML; MG/100ML; MG/100ML; MG/100ML
INJECTION, SOLUTION INTRAVENOUS CONTINUOUS
Status: DISCONTINUED | OUTPATIENT
Start: 2023-12-22 | End: 2023-12-22 | Stop reason: HOSPADM

## 2023-12-22 RX ORDER — OXYCODONE HYDROCHLORIDE 5 MG/1
10 TABLET ORAL
Status: DISCONTINUED | OUTPATIENT
Start: 2023-12-22 | End: 2023-12-22 | Stop reason: HOSPADM

## 2023-12-22 RX ORDER — HYDROMORPHONE HCL IN WATER/PF 6 MG/30 ML
0.4 PATIENT CONTROLLED ANALGESIA SYRINGE INTRAVENOUS EVERY 5 MIN PRN
Status: DISCONTINUED | OUTPATIENT
Start: 2023-12-22 | End: 2023-12-22 | Stop reason: HOSPADM

## 2023-12-22 RX ORDER — ONDANSETRON 2 MG/ML
4 INJECTION INTRAMUSCULAR; INTRAVENOUS EVERY 30 MIN PRN
Status: DISCONTINUED | OUTPATIENT
Start: 2023-12-22 | End: 2023-12-22 | Stop reason: HOSPADM

## 2023-12-22 RX ORDER — ACETAMINOPHEN 500 MG
1000 TABLET ORAL EVERY 6 HOURS PRN
COMMUNITY
Start: 2023-12-22

## 2023-12-22 RX ORDER — FENTANYL CITRATE 50 UG/ML
INJECTION, SOLUTION INTRAMUSCULAR; INTRAVENOUS PRN
Status: DISCONTINUED | OUTPATIENT
Start: 2023-12-22 | End: 2023-12-22

## 2023-12-22 RX ORDER — ACETAMINOPHEN 325 MG/1
650 TABLET ORAL
Status: DISCONTINUED | OUTPATIENT
Start: 2023-12-22 | End: 2023-12-22 | Stop reason: HOSPADM

## 2023-12-22 RX ORDER — PROPOFOL 10 MG/ML
INJECTION, EMULSION INTRAVENOUS PRN
Status: DISCONTINUED | OUTPATIENT
Start: 2023-12-22 | End: 2023-12-22

## 2023-12-22 RX ORDER — DEXAMETHASONE SODIUM PHOSPHATE 4 MG/ML
INJECTION, SOLUTION INTRA-ARTICULAR; INTRALESIONAL; INTRAMUSCULAR; INTRAVENOUS; SOFT TISSUE PRN
Status: DISCONTINUED | OUTPATIENT
Start: 2023-12-22 | End: 2023-12-22

## 2023-12-22 RX ORDER — EPHEDRINE SULFATE 50 MG/ML
INJECTION, SOLUTION INTRAMUSCULAR; INTRAVENOUS; SUBCUTANEOUS PRN
Status: DISCONTINUED | OUTPATIENT
Start: 2023-12-22 | End: 2023-12-22

## 2023-12-22 RX ORDER — LIDOCAINE HYDROCHLORIDE 20 MG/ML
INJECTION, SOLUTION INFILTRATION; PERINEURAL PRN
Status: DISCONTINUED | OUTPATIENT
Start: 2023-12-22 | End: 2023-12-22

## 2023-12-22 RX ORDER — BUPIVACAINE HYDROCHLORIDE 5 MG/ML
INJECTION, SOLUTION PERINEURAL PRN
Status: DISCONTINUED | OUTPATIENT
Start: 2023-12-22 | End: 2023-12-22 | Stop reason: HOSPADM

## 2023-12-22 RX ORDER — HYDROMORPHONE HCL IN WATER/PF 6 MG/30 ML
0.2 PATIENT CONTROLLED ANALGESIA SYRINGE INTRAVENOUS EVERY 5 MIN PRN
Status: DISCONTINUED | OUTPATIENT
Start: 2023-12-22 | End: 2023-12-22 | Stop reason: HOSPADM

## 2023-12-22 RX ORDER — OXYCODONE HYDROCHLORIDE 5 MG/1
5-10 TABLET ORAL EVERY 4 HOURS PRN
Qty: 10 TABLET | Refills: 0 | Status: SHIPPED | OUTPATIENT
Start: 2023-12-22 | End: 2024-01-12

## 2023-12-22 RX ORDER — ONDANSETRON 4 MG/1
4 TABLET, ORALLY DISINTEGRATING ORAL
Status: DISCONTINUED | OUTPATIENT
Start: 2023-12-22 | End: 2023-12-22 | Stop reason: HOSPADM

## 2023-12-22 RX ORDER — FENTANYL CITRATE 50 UG/ML
25 INJECTION, SOLUTION INTRAMUSCULAR; INTRAVENOUS EVERY 5 MIN PRN
Status: DISCONTINUED | OUTPATIENT
Start: 2023-12-22 | End: 2023-12-22 | Stop reason: HOSPADM

## 2023-12-22 RX ORDER — LIDOCAINE 40 MG/G
CREAM TOPICAL
Status: DISCONTINUED | OUTPATIENT
Start: 2023-12-22 | End: 2023-12-22 | Stop reason: HOSPADM

## 2023-12-22 RX ORDER — ACETAMINOPHEN 325 MG/1
975 TABLET ORAL ONCE
Status: DISCONTINUED | OUTPATIENT
Start: 2023-12-22 | End: 2023-12-22 | Stop reason: HOSPADM

## 2023-12-22 RX ORDER — CEFAZOLIN SODIUM/WATER 2 G/20 ML
2 SYRINGE (ML) INTRAVENOUS
Status: COMPLETED | OUTPATIENT
Start: 2023-12-22 | End: 2023-12-22

## 2023-12-22 RX ORDER — GLYCOPYRROLATE 0.2 MG/ML
INJECTION, SOLUTION INTRAMUSCULAR; INTRAVENOUS PRN
Status: DISCONTINUED | OUTPATIENT
Start: 2023-12-22 | End: 2023-12-22

## 2023-12-22 RX ORDER — PROPOFOL 10 MG/ML
INJECTION, EMULSION INTRAVENOUS CONTINUOUS PRN
Status: DISCONTINUED | OUTPATIENT
Start: 2023-12-22 | End: 2023-12-22

## 2023-12-22 RX ORDER — ONDANSETRON 2 MG/ML
INJECTION INTRAMUSCULAR; INTRAVENOUS PRN
Status: DISCONTINUED | OUTPATIENT
Start: 2023-12-22 | End: 2023-12-22

## 2023-12-22 RX ORDER — IBUPROFEN 200 MG
600 TABLET ORAL EVERY 6 HOURS PRN
COMMUNITY
Start: 2023-12-22

## 2023-12-22 RX ORDER — FENTANYL CITRATE 50 UG/ML
50 INJECTION, SOLUTION INTRAMUSCULAR; INTRAVENOUS EVERY 5 MIN PRN
Status: DISCONTINUED | OUTPATIENT
Start: 2023-12-22 | End: 2023-12-22 | Stop reason: HOSPADM

## 2023-12-22 RX ORDER — CALCIUM CARBONATE 500(1250)
TABLET ORAL
Qty: 42 TABLET | Refills: 0 | Status: SHIPPED | OUTPATIENT
Start: 2023-12-22 | End: 2024-01-04

## 2023-12-22 RX ORDER — CEFAZOLIN SODIUM/WATER 2 G/20 ML
2 SYRINGE (ML) INTRAVENOUS SEE ADMIN INSTRUCTIONS
Status: DISCONTINUED | OUTPATIENT
Start: 2023-12-22 | End: 2023-12-22 | Stop reason: HOSPADM

## 2023-12-22 RX ORDER — LABETALOL HYDROCHLORIDE 5 MG/ML
10 INJECTION, SOLUTION INTRAVENOUS EVERY 5 MIN PRN
Status: DISCONTINUED | OUTPATIENT
Start: 2023-12-22 | End: 2023-12-22 | Stop reason: HOSPADM

## 2023-12-22 RX ADMIN — FENTANYL CITRATE 100 MCG: 50 INJECTION INTRAMUSCULAR; INTRAVENOUS at 12:50

## 2023-12-22 RX ADMIN — PHENYLEPHRINE HYDROCHLORIDE 150 MCG: 10 INJECTION INTRAVENOUS at 13:01

## 2023-12-22 RX ADMIN — SODIUM CHLORIDE, POTASSIUM CHLORIDE, SODIUM LACTATE AND CALCIUM CHLORIDE: 600; 310; 30; 20 INJECTION, SOLUTION INTRAVENOUS at 14:34

## 2023-12-22 RX ADMIN — Medication 100 MG: at 12:51

## 2023-12-22 RX ADMIN — SODIUM CHLORIDE, POTASSIUM CHLORIDE, SODIUM LACTATE AND CALCIUM CHLORIDE: 600; 310; 30; 20 INJECTION, SOLUTION INTRAVENOUS at 10:58

## 2023-12-22 RX ADMIN — KETOROLAC TROMETHAMINE 15 MG: 15 INJECTION, SOLUTION INTRAMUSCULAR; INTRAVENOUS at 15:58

## 2023-12-22 RX ADMIN — ONDANSETRON 4 MG: 2 INJECTION INTRAMUSCULAR; INTRAVENOUS at 13:11

## 2023-12-22 RX ADMIN — GLYCOPYRROLATE 0.2 MG: 0.2 INJECTION, SOLUTION INTRAMUSCULAR; INTRAVENOUS at 13:03

## 2023-12-22 RX ADMIN — PROPOFOL 125 MCG/KG/MIN: 10 INJECTION, EMULSION INTRAVENOUS at 12:47

## 2023-12-22 RX ADMIN — GLYCOPYRROLATE 0.2 MG: 0.2 INJECTION, SOLUTION INTRAMUSCULAR; INTRAVENOUS at 12:58

## 2023-12-22 RX ADMIN — HYDROMORPHONE HYDROCHLORIDE 0.5 MG: 1 INJECTION, SOLUTION INTRAMUSCULAR; INTRAVENOUS; SUBCUTANEOUS at 13:09

## 2023-12-22 RX ADMIN — DEXAMETHASONE SODIUM PHOSPHATE 8 MG: 4 INJECTION, SOLUTION INTRA-ARTICULAR; INTRALESIONAL; INTRAMUSCULAR; INTRAVENOUS; SOFT TISSUE at 12:51

## 2023-12-22 RX ADMIN — SODIUM CHLORIDE, POTASSIUM CHLORIDE, SODIUM LACTATE AND CALCIUM CHLORIDE: 600; 310; 30; 20 INJECTION, SOLUTION INTRAVENOUS at 12:41

## 2023-12-22 RX ADMIN — PROPOFOL 120 MG: 10 INJECTION, EMULSION INTRAVENOUS at 12:50

## 2023-12-22 RX ADMIN — LIDOCAINE HYDROCHLORIDE 50 MG: 20 INJECTION, SOLUTION INFILTRATION; PERINEURAL at 12:50

## 2023-12-22 RX ADMIN — Medication 10 MG: at 13:01

## 2023-12-22 RX ADMIN — Medication 2 G: at 12:41

## 2023-12-22 RX ADMIN — SODIUM CHLORIDE, POTASSIUM CHLORIDE, SODIUM LACTATE AND CALCIUM CHLORIDE: 600; 310; 30; 20 INJECTION, SOLUTION INTRAVENOUS at 14:47

## 2023-12-22 ASSESSMENT — ACTIVITIES OF DAILY LIVING (ADL)
ADLS_ACUITY_SCORE: 36

## 2023-12-22 NOTE — ANESTHESIA PROCEDURE NOTES
Airway       Patient location during procedure: OR       Procedure Start/Stop Times: 12/22/2023 12:55 PM  Staff -        CRNA: Chase Shea APRN CRNA       Performed By: CRNA  Consent for Airway        Urgency: elective  Indications and Patient Condition       Indications for airway management: pako-procedural       Induction type:intravenous       Mask difficulty assessment: 1 - vent by mask    Final Airway Details       Final airway type: endotracheal airway       Successful airway: ETT - single and Oral  Endotracheal Airway Details        ETT size (mm): 7.0       Cuffed: yes       Successful intubation technique: direct laryngoscopy and video laryngoscopy       VL Blade Size: Glidescope 3       Grade View of Cords: 1       Adjucts: stylet       Position: Center       Measured from: lips       Secured at (cm): 22       Bite block used: None    Post intubation assessment        Placement verified by: capnometry, equal breath sounds and chest rise        Number of attempts at approach: 1       Secured with: tape       Ease of procedure: easy       Dentition: Intact and Unchanged    Medication(s) Administered   Medication Administration Time: 12/22/2023 12:55 PM

## 2023-12-22 NOTE — ANESTHESIA PREPROCEDURE EVALUATION
"Anesthesia Pre-Procedure Evaluation    Patient: Farida Burk   MRN: [id number] : 1950        Procedure : Procedure(s):  PARATHYROIDECTOMY, POSSIBLE CERVICAL EXPLORATION          Past Medical History:   Diagnosis Date    Alzheimer's disease (H)     eval at [facility]    Other specified hypothyroidism       Past Surgical History:   Procedure Laterality Date    CATARACT EXTRACTION        No Known Allergies   Social History     Tobacco Use    Smoking status: Never     Passive exposure: Never    Smokeless tobacco: Never   Substance Use Topics    Alcohol use: Not Currently     Comment: sober since many years      Wt Readings from Last 1 Encounters:   23 53.8 kg (118 lb 9.6 oz)        Anesthesia Evaluation            ROS/MED HX  ENT/Pulmonary:  - neg pulmonary ROS     Neurologic:     (+)   dementia,                             Cardiovascular:  - neg cardiovascular ROS     METS/Exercise Tolerance: >4 METS    Hematologic:  - neg hematologic  ROS     Musculoskeletal:  - neg musculoskeletal ROS     GI/Hepatic:  - neg GI/hepatic ROS     Renal/Genitourinary:  - neg Renal ROS     Endo: Comment: hyperparathyroidism    (+)          thyroid problem, hypothyroidism,           Psychiatric/Substance Use:  - neg psychiatric ROS     Infectious Disease:  - neg infectious disease ROS     Malignancy:  - neg malignancy ROS     Other:  - neg other ROS          Physical Exam    Airway        Mallampati: II   TM distance: > 3 FB   Neck ROM: full   Mouth opening: > 3 cm    Respiratory Devices and Support         Dental       (+) Completely normal teeth      Cardiovascular   cardiovascular exam normal       Rhythm and rate: regular and normal     Pulmonary   pulmonary exam normal        breath sounds clear to auscultation           OUTSIDE LABS:  CBC: No results found for: \"WBC\", \"HGB\", \"HCT\", \"PLT\"  BMP:   Lab Results   Component Value Date    CR 0.6 10/23/2023     COAGS: No results found for: \"PTT\", \"INR\", \"FIBR\"  POC: No results " "found for: \"BGM\", \"HCG\", \"HCGS\"  HEPATIC: No results found for: \"ALBUMIN\", \"PROTTOTAL\", \"ALT\", \"AST\", \"GGT\", \"ALKPHOS\", \"BILITOTAL\", \"BILIDIRECT\", \"JAYLA\"  OTHER:   Lab Results   Component Value Date    TSH 1.08 02/22/2023       Anesthesia Plan    ASA Status:  2    NPO Status:  NPO Appropriate    Anesthesia Type: General.     - Airway: ETT   Induction: Intravenous.   Maintenance: Balanced.        Consents    Anesthesia Plan(s) and associated risks, benefits, and realistic alternatives discussed. Questions answered and patient/representative(s) expressed understanding.     - Discussed: Risks, Benefits and Alternatives for the PROCEDURE were discussed     - Discussed with:  Patient       Use of blood products discussed: Yes.     - Discussed with: Patient.     - Consented: consented to blood products            Reason for refusal: other.     Postoperative Care    Pain management: IV analgesics, Oral pain medications.   PONV prophylaxis: Ondansetron (or other 5HT-3), Dexamethasone or Solumedrol, Background Propofol Infusion     Comments:    Other Comments: 73 F with PMH hyperparathyroidism, hypothyroidism, and moderate Alzheimer's dementia mostly affecting short term memory. Her daughter, Hue, is present. The patient is still able to make decisions for herself.    Plan: GETA, avoid paralysis. Utilize primary propofol infusion for anesthetic to minimize inhalational agent given dementia. Dilaudid PRN.           Maura Kirby MD    I have reviewed the pertinent notes and labs in the chart from the past 30 days and (re)examined the patient.  Any updates or changes from those notes are reflected in this note.                  "

## 2023-12-22 NOTE — ANESTHESIA POSTPROCEDURE EVALUATION
Patient: Farida Burk    Procedure: Procedure(s):  Right inferior PARATHYROIDECTOMY       Anesthesia Type:  General    Note:  Disposition: Outpatient   Postop Pain Control: Uneventful            Sign Out: Well controlled pain   PONV: No   Neuro/Psych: Uneventful            Sign Out: Acceptable/Baseline neuro status   Airway/Respiratory: Uneventful            Sign Out: Acceptable/Baseline resp. status   CV/Hemodynamics: Uneventful            Sign Out: Acceptable CV status; No obvious hypovolemia; No obvious fluid overload   Other NRE: NONE   DID A NON-ROUTINE EVENT OCCUR? No    Event details/Postop Comments:  Doing well in PACU. A&O x 3.           Last vitals:  Vitals Value Taken Time   /56 12/22/23 1445   Temp 97.2  F (36.2  C) 12/22/23 1430   Pulse 68 12/22/23 1458   Resp 0 12/22/23 1458   SpO2 96 % 12/22/23 1458   Vitals shown include unfiled device data.    Electronically Signed By: Maura Kirby MD  December 22, 2023  3:00 PM

## 2023-12-22 NOTE — DISCHARGE INSTRUCTIONS
Dr. Chávez  [facility] [phone]     HOME CARE FOLLOWING PARATHYROID SURGERY  Dr. RAQUEL Chávez  RESULTS:  Call the office regarding your final pathology report if you have not received your results after 2 full business days.     INCISIONAL CARE:  Do not submerse the incision for 2 weeks.  Sutures will absorb and do not need to be removed.  Leave the steri-strip (white paper tape) in place for two weeks, Dr. Chávez will remove it at your follow up appointment.  A lump/ridge under the incision is normal and will gradually resolve.    ACTIVITY:  Light Activity -- you may immediately be up and about as tolerated.  Driving -- you may drive when comfortable and off narcotic pain medications.  Light Work -- resume when comfortable off pain medications.  (If you can drive, you probably can work.)  Strenuous Work/Activity -- limit lifting to less than 10 pounds for 1 week.  Progressively increase with time.  Active Sports (running, biking, etc.) -- resume when comfortable.    DIET:  No restrictions.  Increased fluid intake is recommended. While taking pain medications, increase dietary fiber or add a fiber supplementation like Metamucil or Citrucel to help prevent constipation - a possible side effect of pain medications.    DISCOMFORT:  Use pain medications as prescribed by your surgeon.  Take the pain medication with some food, when possible, to minimize side effects.  Intermittent use of ice packs may help during the first 48 hours.  Expect gradual improvement.    RETURN APPOINTMENT:  Schedule a follow-up visit 2 weeks post-op.  Office Phone:  [phone]     CONTACT US IF THE FOLLOWING DEVELOPS:   1. A fever that is above 101     2. If there is a large amount of drainage, bleeding, or swelling.   3. Severe pain that is not relieved by your prescription.   4. Drainage that is thick, cloudy, yellow, green or white.   5. Any other questions not answered by  Frequently Asked Questions  sheet.     GENERAL  ANESTHESIA OR SEDATION ADULT DISCHARGE INSTRUCTIONS   SPECIAL PRECAUTIONS FOR 24 HOURS AFTER SURGERY    IT IS NOT UNUSUAL TO FEEL LIGHT-HEADED OR FAINT, UP TO 24 HOURS AFTER SURGERY OR WHILE TAKING PAIN MEDICATION.  IF YOU HAVE THESE SYMPTOMS; SIT FOR A FEW MINUTES BEFORE STANDING AND HAVE SOMEONE ASSIST YOU WHEN YOU GET UP TO WALK OR USE THE BATHROOM.    YOU SHOULD REST AND RELAX FOR THE NEXT 24 HOURS AND YOU MUST MAKE ARRANGEMENTS TO HAVE SOMEONE STAY WITH YOU FOR AT LEAST 24 HOURS AFTER YOUR DISCHARGE.  AVOID HAZARDOUS AND STRENUOUS ACTIVITIES.  DO NOT MAKE IMPORTANT DECISIONS FOR 24 HOURS.    DO NOT DRIVE ANY VEHICLE OR OPERATE MECHANICAL EQUIPMENT FOR 24 HOURS FOLLOWING THE END OF YOUR SURGERY.  EVEN THOUGH YOU MAY FEEL NORMAL, YOUR REACTIONS MAY BE AFFECTED BY THE MEDICATION YOU HAVE RECEIVED.    DO NOT DRINK ALCOHOLIC BEVERAGES FOR 24 HOURS FOLLOWING YOUR SURGERY.    DRINK CLEAR LIQUIDS (APPLE JUICE, GINGER ALE, 7-UP, BROTH, ETC.).  PROGRESS TO YOUR REGULAR DIET AS YOU FEEL ABLE.    YOU MAY HAVE A DRY MOUTH, A SORE THROAT, MUSCLES ACHES OR TROUBLE SLEEPING.  THESE SHOULD GO AWAY AFTER 24 HOURS.    CALL YOUR DOCTOR FOR ANY OF THE FOLLOWING:  SIGNS OF INFECTION (FEVER, GROWING TENDERNESS AT THE SURGERY SITE, A LARGE AMOUNT OF DRAINAGE OR BLEEDING, SEVERE PAIN, FOUL-SMELLING DRAINAGE, REDNESS OR SWELLING.    IT HAS BEEN OVER 8 TO 10 HOURS SINCE SURGERY AND YOU ARE STILL NOT ABLE TO URINATE (PASS WATER).      Maximum acetaminophen (Tylenol) dose from all sources should not exceed 4 grams (4000 mg) per day.    You received Toradol, an IV form of Ibuprofen (Motrin) at 4 pm*.  Do not take any Ibuprofen products until 10 pm*.

## 2023-12-22 NOTE — OP NOTE
General Surgery Operative Note    PREOPERATIVE DIAGNOSIS:  Primary hyperparathyroidism.    POSTOPERATIVE DIAGNOSIS:  Primary hyperparathyroidism.    PROCEDURE: Excision of right inferior parathyroid adenoma    ANESTHESIA:  General.    PREOPERATIVE MEDICATIONS:  Ancef 2 gm.    SURGEON:  Catie Chávez MD    ASSISTANT:  Myla Ornelas PA-C  - the physician assistant was medically necessary in providing adequate exposure in the operating field, maintaining hemostasis, cutting suture, clamping and ligating blood vessels, and visualization of the anatomic structures throughout the surgical procedure.     ESTIMATED BLOOD LOSS:  10 ml    INDICATIONS:  Farida Burk is a 73 year old female who has primary hyperparathyroidism. She has a localizing 4D CT the right inferior neck.  She presents today for neck exploration, excision of parathyroid adenoma.  Her PTH preoperatively is 68.    DESCRIPTION OF PROCEDURE:  The patient was placed supine, head and neck in extension and a bump behind the scapulae.  A suitable transverse cervical neck crease was identified and marked. A time-out was performed verifying correct patient and procedure. A transverse incision was made sharply, then deepened to the level of the platysma with electrocautery. The platysma was divided and superior and inferior subplatysmal flaps were raised.  Midline fascia opened and reflected to the right.  An abnormal parathyroid was identified at the right inferior location.  It was meticulously dissected from the surrounding tissue and submitted for frozen section which confirmed a 305 milligram hypercellular parathyroid.  The site was irrigated and inspected for hemostasis.  The PTH assays were sent at 10 and 20 minutes post-excision and the first value came back at 58 but the 20 minute lab came back at 34, signifying the completeness of the dissection.  The patient's incision site was then closed with running 3-0 Vicryl for the midline fascia,  interrupted 3-0 vicryl for platysma and 4-0 subcuticular Monocryl for skin.  The incision was covered with skin glue and a steri-strip. The patient transferred to recovery in good condition.    INTRAOPERATIVE FINDINGS:  1.  A 305 milligram hypercellular right inferior parathyroid correlated nicely with sestamibi scan.  2.  PTH assay diminished from 68 to 34 at 20 minutes post excision.  3.  Grossly normal thyroid.  4.  Right recurrent laryngeal nerve visualized, function confirmed by nerve monitor.    Specimens:   ID Type Source Tests Collected by Time Destination   1 : right inferior parathyroid gland Tissue Parathyroid, Inferior, Right SURGICAL PATHOLOGY EXAM Catie Chávez MD 12/22/2023  1:29 PM    A : PTH blood - OR #4 (*[id number]) Blood Vein PARATHYROID HORMONE INTACT INTRAOPERATIVE Catie Chávez MD 12/22/2023  1:40 PM    B : PTH blood - OR #4 (*[id number]) Blood Vein PARATHYROID HORMONE INTACT INTRAOPERATIVE Catie Chávez MD 12/22/2023  1:49 PM        Catie Chávez MD

## 2023-12-22 NOTE — ANESTHESIA CARE TRANSFER NOTE
Patient: Farida Burk    Procedure: Procedure(s):  Right inferior PARATHYROIDECTOMY       Diagnosis: Hyperparathyroidism (H24) [E21.3]  Diagnosis Additional Information: No value filed.    Anesthesia Type:   General     Note:    Oropharynx: oropharynx clear of all foreign objects  Level of Consciousness: drowsy  Oxygen Supplementation: face mask  Level of Supplemental Oxygen (L/min / FiO2): 6  Independent Airway: airway patency satisfactory and stable  Dentition: dentition unchanged  Vital Signs Stable: post-procedure vital signs reviewed and stable  Report to RN Given: handoff report given  Patient transferred to: PACU    Handoff Report: Identifed the Patient, Identified the Reponsible Provider, Reviewed the pertinent medical history, Discussed the surgical course, Reviewed Intra-OP anesthesia mangement and issues during anesthesia, Set expectations for post-procedure period and Allowed opportunity for questions and acknowledgement of understanding      Vitals:  Vitals Value Taken Time   /80 12/22/23 1430   Temp 97.2  F (36.2  C) 12/22/23 1430   Pulse 79 12/22/23 1434   Resp 12 12/22/23 1434   SpO2 98 % 12/22/23 1433   Vitals shown include unfiled device data.    Electronically Signed By: RAFAEL Lion CRNA  December 22, 2023  2:35 PM

## 2023-12-26 LAB
PATH REPORT.COMMENTS IMP SPEC: NORMAL
PATH REPORT.COMMENTS IMP SPEC: NORMAL
PATH REPORT.FINAL DX SPEC: NORMAL
PATH REPORT.GROSS SPEC: NORMAL
PATH REPORT.INTRAOP OBS SPEC DOC: NORMAL
PATH REPORT.MICROSCOPIC SPEC OTHER STN: NORMAL
PATH REPORT.RELEVANT HX SPEC: NORMAL
PHOTO IMAGE: NORMAL

## 2023-12-26 PROCEDURE — 88305 TISSUE EXAM BY PATHOLOGIST: CPT | Mod: 26 | Performed by: PATHOLOGY

## 2023-12-26 PROCEDURE — 88331 PATH CONSLTJ SURG 1 BLK 1SPC: CPT | Mod: 26 | Performed by: PATHOLOGY

## 2023-12-29 ENCOUNTER — DOCUMENTATION ONLY (OUTPATIENT)
Dept: OTHER | Facility: CLINIC | Age: 73
End: 2023-12-29
Payer: MEDICARE

## 2024-01-12 ENCOUNTER — OFFICE VISIT (OUTPATIENT)
Dept: SURGERY | Facility: CLINIC | Age: 74
End: 2024-01-12
Payer: MEDICARE

## 2024-01-12 VITALS
RESPIRATION RATE: 16 BRPM | SYSTOLIC BLOOD PRESSURE: 96 MMHG | OXYGEN SATURATION: 99 % | HEIGHT: 66 IN | WEIGHT: 118 LBS | HEART RATE: 78 BPM | BODY MASS INDEX: 18.96 KG/M2 | DIASTOLIC BLOOD PRESSURE: 58 MMHG

## 2024-01-12 DIAGNOSIS — E21.3 HYPERPARATHYROIDISM (H): Primary | ICD-10-CM

## 2024-01-12 PROCEDURE — 99024 POSTOP FOLLOW-UP VISIT: CPT | Performed by: SURGERY

## 2024-01-12 NOTE — PROGRESS NOTES
"Progress Note/Post-op Follow up:  Farida is here for follow up after parathyroidectomy 3 weeks ago.  She reports good energy.  Denies numbness or tingling.  Incisional discomfort is nearly resolved.    Physical Exam:  BP 96/58   Pulse 78   Resp 16   Ht 1.676 m (5' 6\")   Wt 53.5 kg (118 lb)   SpO2 99%   BMI 19.05 kg/m    General:  Appears well, in no acute distress  HEENT:  Chvostek's sign is negative.   Neck:  Incision site healing nicely, Healing ridge is minimal.             Range of motion is normal.  Neuro:  Voice is Normal.    Pathology:  Pathology was reviewed with the patient.   Parathyroid adenoma, 305 mg     Assessment and Plan:  Farida is doing well after right inferior parathyroidectomy.  She may stop the calcium taper at this point.   I recommend applying sunscreen to the incision for the next 6-12 months to protect the scar from darkening as it heals.  I recommend a follow up with Dr. Katharina Alfaro in ~ 6 months to check calcium level to ensure a durable cure.  I would be happy to see her if there are any ongoing issues, but currently, there are no signs of post-operative complications.    Catie Chávez MD  Please route or send letter to:  Primary Care Provider (PCP) and Referring Provider        "

## 2024-02-18 ENCOUNTER — OFFICE VISIT (OUTPATIENT)
Dept: URGENT CARE | Facility: URGENT CARE | Age: 74
End: 2024-02-18
Payer: MEDICARE

## 2024-02-18 VITALS
WEIGHT: 117 LBS | RESPIRATION RATE: 18 BRPM | BODY MASS INDEX: 18.88 KG/M2 | TEMPERATURE: 98.1 F | OXYGEN SATURATION: 98 % | HEART RATE: 62 BPM | DIASTOLIC BLOOD PRESSURE: 78 MMHG | SYSTOLIC BLOOD PRESSURE: 115 MMHG

## 2024-02-18 DIAGNOSIS — R30.0 DYSURIA: Primary | ICD-10-CM

## 2024-02-18 LAB
ALBUMIN UR-MCNC: NEGATIVE MG/DL
APPEARANCE UR: ABNORMAL
BACTERIA #/AREA URNS HPF: ABNORMAL /HPF
BILIRUB UR QL STRIP: NEGATIVE
COLOR UR AUTO: YELLOW
GLUCOSE UR STRIP-MCNC: NEGATIVE MG/DL
HGB UR QL STRIP: ABNORMAL
KETONES UR STRIP-MCNC: NEGATIVE MG/DL
LEUKOCYTE ESTERASE UR QL STRIP: ABNORMAL
NITRATE UR QL: NEGATIVE
PH UR STRIP: 7.5 [PH] (ref 5–7)
RBC #/AREA URNS AUTO: ABNORMAL /HPF
SP GR UR STRIP: 1.01 (ref 1–1.03)
SQUAMOUS #/AREA URNS AUTO: ABNORMAL /LPF
UROBILINOGEN UR STRIP-ACNC: 0.2 E.U./DL
WBC #/AREA URNS AUTO: ABNORMAL /HPF
WBC CLUMPS #/AREA URNS HPF: PRESENT /HPF

## 2024-02-18 PROCEDURE — 87186 SC STD MICRODIL/AGAR DIL: CPT | Performed by: NURSE PRACTITIONER

## 2024-02-18 PROCEDURE — 81001 URINALYSIS AUTO W/SCOPE: CPT

## 2024-02-18 PROCEDURE — 99214 OFFICE O/P EST MOD 30 MIN: CPT | Mod: 24 | Performed by: NURSE PRACTITIONER

## 2024-02-18 PROCEDURE — 87086 URINE CULTURE/COLONY COUNT: CPT | Performed by: NURSE PRACTITIONER

## 2024-02-18 RX ORDER — PHENAZOPYRIDINE HYDROCHLORIDE 200 MG/1
200 TABLET, FILM COATED ORAL 3 TIMES DAILY PRN
Qty: 6 TABLET | Refills: 0 | Status: SHIPPED | OUTPATIENT
Start: 2024-02-18

## 2024-02-18 RX ORDER — SULFAMETHOXAZOLE/TRIMETHOPRIM 800-160 MG
1 TABLET ORAL 2 TIMES DAILY
Qty: 10 TABLET | Refills: 0 | Status: SHIPPED | OUTPATIENT
Start: 2024-02-18 | End: 2024-02-23

## 2024-02-18 NOTE — PROGRESS NOTES
Assessment & Plan     Dysuria  - UA with Microscopic reflex to Culture - Clinic Collect  - UA Microscopic with Reflex to Culture  - Urine Culture  - sulfamethoxazole-trimethoprim (BACTRIM DS) 800-160 MG tablet  Dispense: 10 tablet; Refill: 0  - phenazopyridine (PYRIDIUM) 200 MG tablet  Dispense: 6 tablet; Refill: 0     Patient Instructions     Results for orders placed or performed in visit on 02/18/24   UA with Microscopic reflex to Culture - Clinic Collect     Status: Abnormal    Specimen: Urine, Midstream   Result Value Ref Range    Color Urine Yellow Colorless, Straw, Light Yellow, Yellow    Appearance Urine Slightly Cloudy (A) Clear    Glucose Urine Negative Negative mg/dL    Bilirubin Urine Negative Negative    Ketones Urine Negative Negative mg/dL    Specific Gravity Urine 1.010 1.003 - 1.035    Blood Urine Large (A) Negative    pH Urine 7.5 (H) 5.0 - 7.0    Protein Albumin Urine Negative Negative mg/dL    Urobilinogen Urine 0.2 0.2, 1.0 E.U./dL    Nitrite Urine Negative Negative    Leukocyte Esterase Urine Moderate (A) Negative   UA Microscopic with Reflex to Culture     Status: Abnormal   Result Value Ref Range    Bacteria Urine Moderate (A) None Seen /HPF    RBC Urine 2-5 (A) 0-2 /HPF /HPF    WBC Urine 10-25 (A) 0-5 /HPF /HPF    Squamous Epithelials Urine Few (A) None Seen /LPF    WBC Clumps Urine Present (A) None Seen /HPF     UA positive - moderate bacteria and 10-25 WBC  UC pending  Push fluids  Pyridium PRN - urine will be dark orange   Antibiotics bactrim  for 5  days  Will call if bacteria is resistant to the antibiotic prescribed.    F/u in 1 week if persists or 3 days if worsening.         Return in about 2 days (around 2/20/2024) for with regular provider if symptoms persist.    RAFAEL Lyle [facility] DIMITRI Iqbal is a 73 year old female who presents to clinic today for the following health issues:  Chief Complaint   Patient presents with     UTI     Ongoing UTI symptoms. Finished cefdinir a few days ago for UTI, but symptoms haven't gone away      HPI    UTI    Onset of symptoms was 1day(s).  Course of illness is same  Severity moderate  Current and associated symptoms dysuria, frequency, urgency, and burning  Treatment and measures tried Cranberry juice and Antibiotics  Predisposing factors include history of frequent UTI's  Patient denies rigors, flank pain, temperature > 101 degrees F., vaginal discharge, vaginal odor, and vaginal itching    Just finished omnicef a week ago for a UTI - they did a culture but NOT sensitivities.      Review of Systems  Constitutional, HEENT, cardiovascular, pulmonary, GI, , musculoskeletal, neuro, skin, endocrine and psych systems are negative, except as otherwise noted.      Objective    /78 (BP Location: Right arm, Patient Position: Sitting, Cuff Size: Adult Regular)   Pulse 62   Temp 98.1  F (36.7  C) (Oral)   Resp 18   Wt 53.1 kg (117 lb)   SpO2 98%   BMI 18.88 kg/m    Physical Exam   GENERAL: alert and no distress  RESP: lungs clear to auscultation - no rales, rhonchi or wheezes  CV: regular rate and rhythm, normal S1 S2, no S3 or S4, no murmur, click or rub, no peripheral edema  ABDOMEN: soft, nontender, no hepatosplenomegaly, no masses and bowel sounds normal  MS: no gross musculoskeletal defects noted, no edema  BACK: no CVA tenderness, no paralumbar tenderness

## 2024-02-18 NOTE — PATIENT INSTRUCTIONS
Results for orders placed or performed in visit on 02/18/24   UA with Microscopic reflex to Culture - Clinic Collect     Status: Abnormal    Specimen: Urine, Midstream   Result Value Ref Range    Color Urine Yellow Colorless, Straw, Light Yellow, Yellow    Appearance Urine Slightly Cloudy (A) Clear    Glucose Urine Negative Negative mg/dL    Bilirubin Urine Negative Negative    Ketones Urine Negative Negative mg/dL    Specific Gravity Urine 1.010 1.003 - 1.035    Blood Urine Large (A) Negative    pH Urine 7.5 (H) 5.0 - 7.0    Protein Albumin Urine Negative Negative mg/dL    Urobilinogen Urine 0.2 0.2, 1.0 E.U./dL    Nitrite Urine Negative Negative    Leukocyte Esterase Urine Moderate (A) Negative   UA Microscopic with Reflex to Culture     Status: Abnormal   Result Value Ref Range    Bacteria Urine Moderate (A) None Seen /HPF    RBC Urine 2-5 (A) 0-2 /HPF /HPF    WBC Urine 10-25 (A) 0-5 /HPF /HPF    Squamous Epithelials Urine Few (A) None Seen /LPF    WBC Clumps Urine Present (A) None Seen /HPF     UA positive - moderate bacteria and 10-25 WBC  UC pending  Push fluids  Pyridium PRN - urine will be dark orange   Antibiotics bactrim  for 5  days  Will call if bacteria is resistant to the antibiotic prescribed.    F/u in 1 week if persists or 3 days if worsening.

## 2024-02-19 ENCOUNTER — TELEPHONE (OUTPATIENT)
Dept: SCHEDULING | Facility: CLINIC | Age: 74
End: 2024-02-19
Payer: MEDICARE

## 2024-02-19 ENCOUNTER — TELEPHONE (OUTPATIENT)
Dept: URGENT CARE | Facility: URGENT CARE | Age: 74
End: 2024-02-19
Payer: MEDICARE

## 2024-02-19 NOTE — TELEPHONE ENCOUNTER
Pt daughter is requesting a letter stating Direction of what Medication she was prescribed for, How many days and how many times a day to take. Per Nursing Home.    They already have the medication. But can't start taking until letter is faxed       Letter to be faxed over to [facility]. ASAP  [phone].     Please advise    Ryley Hua CMA on 2/19/2024 at 5:17 PM

## 2024-02-19 NOTE — TELEPHONE ENCOUNTER
Called and LVM to Pt's daughter cell # to request more detail as to which Pharmacy she would like us to send the script to. Will try to call back later.    sulfamethoxazole-trimethoprim (BACTRIM DS) 800-160 MG tablet     Ryley Hua CMA on 2/19/2024 at 4:13 PM

## 2024-02-19 NOTE — LETTER
February 19, 2024      Farida Burk  [address] [facility] [address]        To Whom It May Concern:    Farida Burk was seen in our clinic. She may take her Bactrim DS twice a day for 5 days and Pyridium 200mg po bid for 3 days.     Sincerely,      Dr. Haider Stone  Physician No Ref-Primary

## 2024-02-19 NOTE — TELEPHONE ENCOUNTER
Pt's daughter Hue called requesting a letter be faxed to [facility], fax number [phone] so they can give the Bactrim DS and Pyridium. They already have these Rx's since they were written on 2/18/24 at .     Phone number of [facility].  [phone] - was called and left a VM to start these.     Urgent Care please fax a letter with these orders asap. Thank you.

## 2024-02-19 NOTE — TELEPHONE ENCOUNTER
General Call    Contacts         Type Contact Phone/Fax    02/19/2024 08:39 AM CST Phone (Incoming) Hue Mata (Emergency Contact) [phone]          Reason for Call:  Script resent to assistant living    What are your questions or concerns:  Daughter, Hue calling to have script, sulfamethoxazole-trimethoprim (BACTRIM DS) 800-160 MG tablet  resent to [facility]. Also need order for OTC Equate urinary pain ( OTC form of pyridium).     [phone].    Date of last appointment with provider: 2/18/24    Could we send this information to you in [facility] or would you prefer to receive a phone call?:   Patient would prefer a phone call   Okay to leave a detailed message?: Yes at Cell number on file:    Telephone Information:   Mobile [phone]-Hue-daughter

## 2024-02-19 NOTE — TELEPHONE ENCOUNTER
Medication Question or Refill    Contacts       Contact Date Type Contact Phone/Fax    02/19/2024 02:15 PM CST Phone (Incoming) Berna Lovelace (Emergency Contact) [phone]            What medication are you calling about (include dose and sig)?: Sulfamethoxazole-Trimethoprim 800-160 MG Oral Tablet (BACTRIM DS)  Phenazopyridine HCl 200 MG Oral Tablet (PYRIDIUM)     Preferred Pharmacy:   [facility] - [facility] [address]  [address] [address]  Phone: [phone] Fax: [phone]    [id number] [place], [place] - [address] AT [address] [place] [address] & [address] [place] [address]  Phone: [phone] Fax: [phone]    [id number] [address] AT [address] [place] [address] & [facility]  [address]  [facility] [address]  Phone: [phone] Fax: [phone]    [id number] [place] [address] AT [place] [address]  [facility] [address]  Phone: [phone] Fax: [phone]    [facility] [address]  [address] [address]  Phone: [phone] Fax: [phone] Alternate Fax: [phone], [phone]      Controlled Substance Agreement on file:   CSA -- Patient Level:    CSA: None found at the patient level.       Who prescribed the medication?: JAISON Avendaño, RAFAEL Valle CNP       Do you need a refill? No    When did you use the medication last? 2/18/24     Patient offered an appointment? No    Do you have any questions or concerns?  Yes: Patient sister is calling about UC visit for medication to be sent to her assisted living       Could we send this information to you in [employer] or would you prefer to receive a phone call?:   Patient would prefer a phone call   Okay to leave a detailed message?: Yes at Other phone number: [phone]

## 2024-02-20 ENCOUNTER — TELEPHONE (OUTPATIENT)
Dept: FAMILY MEDICINE | Facility: CLINIC | Age: 74
End: 2024-02-20
Payer: MEDICARE

## 2024-02-20 LAB
BACTERIA UR CULT: ABNORMAL
BACTERIA UR CULT: ABNORMAL

## 2024-02-20 NOTE — TELEPHONE ENCOUNTER
I faxed the letter for instruction about how the patient should take her medication to the [facility] today at 6:16pm and I did call and left a message on the daughter Berna voice mail too.

## 2024-02-21 ENCOUNTER — TRANSFERRED RECORDS (OUTPATIENT)
Dept: HEALTH INFORMATION MANAGEMENT | Facility: CLINIC | Age: 74
End: 2024-02-21
Payer: MEDICARE

## 2024-02-21 LAB
CREATININE (EXTERNAL): 0.79 MG/DL (ref 0.52–1.04)
TSH SERPL-ACNC: 1.49 UIU/ML (ref 0.47–4.68)

## 2024-02-28 ENCOUNTER — TRANSFERRED RECORDS (OUTPATIENT)
Dept: HEALTH INFORMATION MANAGEMENT | Facility: CLINIC | Age: 74
End: 2024-02-28
Payer: MEDICARE

## 2024-02-28 ENCOUNTER — MEDICAL CORRESPONDENCE (OUTPATIENT)
Dept: HEALTH INFORMATION MANAGEMENT | Facility: CLINIC | Age: 74
End: 2024-02-28
Payer: MEDICARE

## 2024-03-04 ENCOUNTER — TRANSCRIBE ORDERS (OUTPATIENT)
Dept: ONCOLOGY | Facility: CLINIC | Age: 74
End: 2024-03-04
Payer: MEDICARE

## 2024-03-04 DIAGNOSIS — E21.3 HYPERPARATHYROIDISM, UNSPECIFIED (H): Primary | ICD-10-CM

## 2024-03-04 RX ORDER — METHYLPREDNISOLONE SODIUM SUCCINATE 125 MG/2ML
125 INJECTION, POWDER, LYOPHILIZED, FOR SOLUTION INTRAMUSCULAR; INTRAVENOUS
Status: CANCELLED
Start: 2024-04-19

## 2024-03-04 RX ORDER — ALBUTEROL SULFATE 90 UG/1
1-2 AEROSOL, METERED RESPIRATORY (INHALATION)
Status: CANCELLED
Start: 2024-04-19

## 2024-03-04 RX ORDER — HEPARIN SODIUM,PORCINE 10 UNIT/ML
5-20 VIAL (ML) INTRAVENOUS DAILY PRN
Status: CANCELLED | OUTPATIENT
Start: 2024-04-19

## 2024-03-04 RX ORDER — DIPHENHYDRAMINE HYDROCHLORIDE 50 MG/ML
50 INJECTION INTRAMUSCULAR; INTRAVENOUS
Status: CANCELLED
Start: 2024-04-19

## 2024-03-04 RX ORDER — EPINEPHRINE 1 MG/ML
0.3 INJECTION, SOLUTION INTRAMUSCULAR; SUBCUTANEOUS EVERY 5 MIN PRN
Status: CANCELLED | OUTPATIENT
Start: 2024-04-19

## 2024-03-04 RX ORDER — ZOLEDRONIC ACID 5 MG/100ML
5 INJECTION, SOLUTION INTRAVENOUS ONCE
Status: CANCELLED
Start: 2024-04-19

## 2024-03-04 RX ORDER — HEPARIN SODIUM (PORCINE) LOCK FLUSH IV SOLN 100 UNIT/ML 100 UNIT/ML
5 SOLUTION INTRAVENOUS
Status: CANCELLED | OUTPATIENT
Start: 2024-04-19

## 2024-03-04 RX ORDER — MEPERIDINE HYDROCHLORIDE 25 MG/ML
25 INJECTION INTRAMUSCULAR; INTRAVENOUS; SUBCUTANEOUS EVERY 30 MIN PRN
Status: CANCELLED | OUTPATIENT
Start: 2024-04-19

## 2024-03-04 RX ORDER — ALBUTEROL SULFATE 0.83 MG/ML
2.5 SOLUTION RESPIRATORY (INHALATION)
Status: CANCELLED | OUTPATIENT
Start: 2024-04-19

## 2024-04-19 ENCOUNTER — INFUSION THERAPY VISIT (OUTPATIENT)
Dept: INFUSION THERAPY | Facility: CLINIC | Age: 74
End: 2024-04-19
Attending: INTERNAL MEDICINE
Payer: MEDICARE

## 2024-04-19 VITALS
HEART RATE: 59 BPM | SYSTOLIC BLOOD PRESSURE: 127 MMHG | DIASTOLIC BLOOD PRESSURE: 81 MMHG | OXYGEN SATURATION: 98 % | RESPIRATION RATE: 18 BRPM | TEMPERATURE: 97.3 F

## 2024-04-19 DIAGNOSIS — E21.3 HYPERPARATHYROIDISM, UNSPECIFIED (H): Primary | ICD-10-CM

## 2024-04-19 PROCEDURE — 250N000011 HC RX IP 250 OP 636: Mod: JZ | Performed by: INTERNAL MEDICINE

## 2024-04-19 PROCEDURE — 258N000003 HC RX IP 258 OP 636: Performed by: INTERNAL MEDICINE

## 2024-04-19 PROCEDURE — 96365 THER/PROPH/DIAG IV INF INIT: CPT

## 2024-04-19 RX ORDER — ALBUTEROL SULFATE 90 UG/1
1-2 AEROSOL, METERED RESPIRATORY (INHALATION)
Start: 2025-04-19

## 2024-04-19 RX ORDER — EPINEPHRINE 1 MG/ML
0.3 INJECTION, SOLUTION INTRAMUSCULAR; SUBCUTANEOUS EVERY 5 MIN PRN
OUTPATIENT
Start: 2025-04-19

## 2024-04-19 RX ORDER — ALBUTEROL SULFATE 0.83 MG/ML
2.5 SOLUTION RESPIRATORY (INHALATION)
OUTPATIENT
Start: 2025-04-19

## 2024-04-19 RX ORDER — METHYLPREDNISOLONE SODIUM SUCCINATE 125 MG/2ML
125 INJECTION, POWDER, LYOPHILIZED, FOR SOLUTION INTRAMUSCULAR; INTRAVENOUS
Start: 2025-04-19

## 2024-04-19 RX ORDER — MEPERIDINE HYDROCHLORIDE 25 MG/ML
25 INJECTION INTRAMUSCULAR; INTRAVENOUS; SUBCUTANEOUS EVERY 30 MIN PRN
OUTPATIENT
Start: 2025-04-19

## 2024-04-19 RX ORDER — HEPARIN SODIUM,PORCINE 10 UNIT/ML
5-20 VIAL (ML) INTRAVENOUS DAILY PRN
OUTPATIENT
Start: 2025-04-19

## 2024-04-19 RX ORDER — DIPHENHYDRAMINE HYDROCHLORIDE 50 MG/ML
50 INJECTION INTRAMUSCULAR; INTRAVENOUS
Start: 2025-04-19

## 2024-04-19 RX ORDER — ZOLEDRONIC ACID 5 MG/100ML
5 INJECTION, SOLUTION INTRAVENOUS ONCE
Status: CANCELLED
Start: 2025-04-19

## 2024-04-19 RX ORDER — ZOLEDRONIC ACID 5 MG/100ML
5 INJECTION, SOLUTION INTRAVENOUS ONCE
Status: COMPLETED | OUTPATIENT
Start: 2024-04-19 | End: 2024-04-19

## 2024-04-19 RX ORDER — HEPARIN SODIUM (PORCINE) LOCK FLUSH IV SOLN 100 UNIT/ML 100 UNIT/ML
5 SOLUTION INTRAVENOUS
OUTPATIENT
Start: 2025-04-19

## 2024-04-19 RX ADMIN — SODIUM CHLORIDE 250 ML: 9 INJECTION, SOLUTION INTRAVENOUS at 10:06

## 2024-04-19 RX ADMIN — ZOLEDRONIC ACID 5 MG: 0.05 INJECTION, SOLUTION INTRAVENOUS at 10:06

## 2024-04-19 NOTE — PROGRESS NOTES
Infusion Nursing Note:  Farida Burk presents today for 1st Reclast Infusion.    Patient seen by provider today: No   present during visit today: Not Applicable.    Note: labs 2/21, WNL      Intravenous Access:  Peripheral IV placed.    Post Infusion Assessment:  Patient tolerated infusion without incident.  Blood return noted pre and post infusion.  Site patent and intact, free from redness, edema or discomfort.  No evidence of extravasations.  Access discontinued per protocol.       Discharge Plan:   Patient declined prescription refills.  Discharge instructions reviewed with: Patient.  Patient and/or family verbalized understanding of discharge instructions and all questions answered.  AVS to patient via [employer].   Patient discharged in stable condition accompanied by: self and sister.  Departure Mode: Ambulatory.      Braden Ortiz RN

## 2024-10-20 ENCOUNTER — HEALTH MAINTENANCE LETTER (OUTPATIENT)
Age: 74
End: 2024-10-20

## 2025-01-01 ENCOUNTER — ANCILLARY PROCEDURE (OUTPATIENT)
Dept: GENERAL RADIOLOGY | Facility: CLINIC | Age: 75
End: 2025-01-01
Attending: PHYSICIAN ASSISTANT
Payer: MEDICARE

## 2025-01-01 ENCOUNTER — OFFICE VISIT (OUTPATIENT)
Dept: URGENT CARE | Facility: URGENT CARE | Age: 75
End: 2025-01-01
Payer: MEDICARE

## 2025-01-01 VITALS
BODY MASS INDEX: 19.29 KG/M2 | HEART RATE: 86 BPM | RESPIRATION RATE: 16 BRPM | OXYGEN SATURATION: 98 % | WEIGHT: 120 LBS | TEMPERATURE: 97.2 F | DIASTOLIC BLOOD PRESSURE: 60 MMHG | SYSTOLIC BLOOD PRESSURE: 100 MMHG | HEIGHT: 66 IN

## 2025-01-01 DIAGNOSIS — U07.1 INFECTION DUE TO 2019 NOVEL CORONAVIRUS: Primary | ICD-10-CM

## 2025-01-01 PROCEDURE — 99213 OFFICE O/P EST LOW 20 MIN: CPT | Performed by: PHYSICIAN ASSISTANT

## 2025-01-01 PROCEDURE — 71046 X-RAY EXAM CHEST 2 VIEWS: CPT | Mod: TC | Performed by: RADIOLOGY

## 2025-01-01 RX ORDER — GUAIFENESIN 1200 MG/1
1200 TABLET, EXTENDED RELEASE ORAL 2 TIMES DAILY
Qty: 60 TABLET | Refills: 0 | Status: SHIPPED | OUTPATIENT
Start: 2025-01-01

## 2025-01-01 RX ORDER — ACETAMINOPHEN 500 MG
500-1000 TABLET ORAL EVERY 4 HOURS PRN
COMMUNITY
Start: 2025-01-01

## 2025-01-01 ASSESSMENT — ENCOUNTER SYMPTOMS
RHINORRHEA: 1
COUGH: 1

## 2025-01-01 NOTE — PATIENT INSTRUCTIONS
Coronavirus (COVID-19): Care Instructions  What is COVID-19?  COVID-19 is a disease caused by a type of coronavirus. This illness was first found in 2019 and has since spread worldwide (pandemic). Symptoms can range from mild, such as fever and body aches, to severe, including trouble breathing. COVID-19 can be deadly.  Coronaviruses are a large group of viruses. Some types cause the common cold. Others cause more serious illnesses like Middle East respiratory syndrome (MERS) and severe acute respiratory syndrome (SARS).  Follow-up care is a key part of your treatment and safety. Be sure to make and go to all appointments, and call your doctor if you are having problems. It's also a good idea to know your test results and keep a list of the medicines you take.  How can you self-isolate when you have COVID-19?  If you have COVID-19, there are things you can do to help avoid spreading the virus to others.  Stay home, and avoid contact with other people.  Limit contact with people in your home. If possible, stay in a separate bedroom and use a separate bathroom.  Wear a high-quality mask when you are around other people.  Improve airflow. If you have to spend time indoors with others, open windows and doors. Or you can use a fan to blow air away from people and out a window.  Avoid contact with pets and other animals.  Cover your mouth and nose with a tissue when you cough or sneeze. Then throw it in the trash right away.  Wash your hands often, especially after you cough or sneeze. Use soap and water, and scrub for at least 20 seconds. If soap and water aren't available, use an alcohol-based hand .  Don't share personal household items. These include bedding, towels, cups and glasses, and eating utensils.  Wash laundry in the warmest water allowed for the fabric type, and dry it completely. It's okay to wash other people's laundry with yours.  Clean and disinfect your home. Use household  and  "disinfectant wipes or sprays.  Go to the CDC website at cdc.gov if you have questions.  When can you end self-isolation for COVID-19?  If you know or think that you have the virus, you may need to self-isolate. When you can be around other people you live with and leave home depends on whether you have symptoms.  If you tested positive but had no symptoms, wear a mask for at least 5 days.  If you have symptoms, you need to wait until your symptoms are getting better and you haven't had a fever for 24 hours while not taking medicines to lower the fever. Once you leave isolation, wear a mask for at least 5 more days when you are around other people.  If you were very sick, were in the hospital for COVID, or have a weakened immune system, talk to your doctor about how long you should isolate and wear a mask. It might be longer than 5 days.  Call your doctor or seek care if you have questions about your symptoms or when to end isolation.  Check the [facility] website at cdc.gov for the most current information.  Where can you learn more?  Go to https://www.[employer].net/patiented  Enter C007 in the search box to learn more about \"Coronavirus (COVID-19): Care Instructions.\"  Current as of: October 28, 2024  Content Version: 14.3    2024 [employer].   Care instructions adapted under license by your healthcare professional. If you have questions about a medical condition or this instruction, always ask your healthcare professional. [employer] disclaims any warranty or liability for your use of this information.    "

## 2025-01-01 NOTE — PROGRESS NOTES
Assessment & Plan        1. Infection due to 2019 novel coronavirus (Primary)    -Chest x-ray is negative for pneumonia per radiology report  - nirmatrelvir and ritonavir (PAXLOVID) 300 mg/100 mg therapy pack; Take 3 tablets by mouth 2 times daily for 5 days. (Take 2 Nirmatrelvir tablets and 1 Ritonavir tablet twice daily for 5 days)  Dispense: 30 tablet; Refill: 0  - acetaminophen (TYLENOL) 500 MG tablet; Take 1-2 tablets (500-1,000 mg) by mouth every 4 hours as needed for mild pain.  - guaiFENesin 1200 MG TB12; Take 1 tablet (1,200 mg) by mouth 2 times daily.  Dispense: 60 tablet; Refill: 0  - XR Chest 2 Views    Results for orders placed or performed in visit on 01/01/25   XR Chest 2 Views     Status: None    Narrative    EXAM: XR CHEST 2 VIEWS  LOCATION: [facility]  DATE: 01/01/2025    INDICATION: COVID-19 infection, worsening cough.  COMPARISON: None.      Impression    IMPRESSION: Negative chest.         Patient Instructions   Coronavirus (COVID-19): Care Instructions  What is COVID-19?  COVID-19 is a disease caused by a type of coronavirus. This illness was first found in 2019 and has since spread worldwide (pandemic). Symptoms can range from mild, such as fever and body aches, to severe, including trouble breathing. COVID-19 can be deadly.  Coronaviruses are a large group of viruses. Some types cause the common cold. Others cause more serious illnesses like [place] respiratory syndrome (MERS) and severe acute respiratory syndrome (SARS).  Follow-up care is a key part of your treatment and safety. Be sure to make and go to all appointments, and call your doctor if you are having problems. It's also a good idea to know your test results and keep a list of the medicines you take.  How can you self-isolate when you have COVID-19?  If you have COVID-19, there are things you can do to help avoid spreading the virus to others.  Stay home, and avoid contact with other people.  Limit  contact with people in your home. If possible, stay in a separate bedroom and use a separate bathroom.  Wear a high-quality mask when you are around other people.  Improve airflow. If you have to spend time indoors with others, open windows and doors. Or you can use a fan to blow air away from people and out a window.  Avoid contact with pets and other animals.  Cover your mouth and nose with a tissue when you cough or sneeze. Then throw it in the trash right away.  Wash your hands often, especially after you cough or sneeze. Use soap and water, and scrub for at least 20 seconds. If soap and water aren't available, use an alcohol-based hand .  Don't share personal household items. These include bedding, towels, cups and glasses, and eating utensils.  Wash laundry in the warmest water allowed for the fabric type, and dry it completely. It's okay to wash other people's laundry with yours.  Clean and disinfect your home. Use household  and disinfectant wipes or sprays.  Go to the CDC website at cdc.gov if you have questions.  When can you end self-isolation for COVID-19?  If you know or think that you have the virus, you may need to self-isolate. When you can be around other people you live with and leave home depends on whether you have symptoms.  If you tested positive but had no symptoms, wear a mask for at least 5 days.  If you have symptoms, you need to wait until your symptoms are getting better and you haven't had a fever for 24 hours while not taking medicines to lower the fever. Once you leave isolation, wear a mask for at least 5 more days when you are around other people.  If you were very sick, were in the hospital for COVID, or have a weakened immune system, talk to your doctor about how long you should isolate and wear a mask. It might be longer than 5 days.  Call your doctor or seek care if you have questions about your symptoms or when to end isolation.  Check the CDC website at cdc.gov  "for the most current information.  Where can you learn more?  Go to https://www.[employer].net/patiented  Enter C007 in the search box to learn more about \"Coronavirus (COVID-19): Care Instructions.\"  Current as of: October 28, 2024  Content Version: 14.3    2024 [employer].   Care instructions adapted under license by your healthcare professional. If you have questions about a medical condition or this instruction, always ask your healthcare professional. [employer] disclaims any warranty or liability for your use of this information.       Return if symptoms worsen or fail to improve, for Follow up, 3- 5 days.    At the end of the encounter, I discussed results, diagnosis, medications. Discussed red flags for immediate return to clinic/ER, as well as indications for follow up if no improvement. Patient`s daughter understood and agreed to plan. Patient was stable for discharge.    Staci Iqbal is a 74 year old female who presents to clinic today with daughter  for the following health issues:  Chief Complaint   Patient presents with    Urgent Care     Tested positive for Covid today at home. Bad cold symptoms. Sick x 3 days.      HPI    Patient is here with daughter.  Patient has a history of Alzheimer's.  Daughter providing history of illness.  Patient's daughter reports COVID-19 infection.  She reports congestion, runny nose and cough.  Daughter is concerned about worsening COVID-pneumonia.  Patient is afebrile in the clinic.    Review of Systems   HENT:  Positive for congestion and rhinorrhea.    Respiratory:  Positive for cough.        Problem List:  2024-03: Hyperparathyroidism, unspecified (H)  2023-02: Urinary problem  2023-02: Urinary tract infection without hematuria, site unspecified  2022-11: Visit for screening mammogram  2022-11: Screen for colon cancer  2022-11: Current moderate episode of major depressive disorder   without prior episode (H)  2022-11: Moderate early " "onset Alzheimer's dementia with mood   disturbance (H)  2022-08: Alzheimer's disease (H)  2022-08: Other specified hypothyroidism      Past Medical History:   Diagnosis Date    Alzheimer's disease (H)     eval at [facility]    Other specified hypothyroidism        Social History     Tobacco Use    Smoking status: Never     Passive exposure: Never    Smokeless tobacco: Never   Substance Use Topics    Alcohol use: Not Currently     Comment: sober since many years           Objective    /60   Pulse 86   Temp 97.2  F (36.2  C) (Temporal)   Resp 16   Ht 1.676 m (5' 6\")   Wt 54.4 kg (120 lb)   SpO2 98%   BMI 19.37 kg/m    Physical Exam  Constitutional:       Appearance: Normal appearance.   HENT:      Head: Normocephalic.      Right Ear: Tympanic membrane normal.      Left Ear: Tympanic membrane normal.      Nose: Congestion and rhinorrhea present. Rhinorrhea is clear.      Mouth/Throat:      Mouth: Mucous membranes are moist.      Pharynx: Oropharynx is clear. Uvula midline. No posterior oropharyngeal erythema.   Cardiovascular:      Rate and Rhythm: Normal rate and regular rhythm.   Pulmonary:      Effort: Pulmonary effort is normal.      Breath sounds: Normal breath sounds.   Lymphadenopathy:      Head:      Right side of head: No submental, submandibular or tonsillar adenopathy.      Left side of head: No submental, submandibular or tonsillar adenopathy.      Cervical: No cervical adenopathy.      Right cervical: No superficial cervical adenopathy.     Left cervical: No superficial cervical adenopathy.   Skin:     General: Skin is warm and dry.      Findings: No rash.   Neurological:      Mental Status: She is alert.   Psychiatric:         Mood and Affect: Mood normal.         Behavior: Behavior normal.              Cortney Murrieta PA-C    "

## 2025-05-03 ENCOUNTER — HEALTH MAINTENANCE LETTER (OUTPATIENT)
Age: 75
End: 2025-05-03

## (undated) DEVICE — BAG CLEAR TRASH 1.3M 39X33" P4040C

## (undated) DEVICE — ESU CORD BIPOLAR GREEN 10-4000

## (undated) DEVICE — LINEN TOWEL PACK X10 5473

## (undated) DEVICE — ESU ELEC BLADE 2.75" COATED/INSULATED E1455

## (undated) DEVICE — SU VICRYL 4-0 TIE 12X18" DYED J103T

## (undated) DEVICE — PACK MAJOR HEAD AND NECK RIDGES

## (undated) DEVICE — SPONGE KITTNER 30-101

## (undated) DEVICE — DRSG TELFA 2X3"

## (undated) DEVICE — MANIFOLD NEPTUNE 4 PORT 700-20

## (undated) DEVICE — DRSG STERI STRIP 1/2X4" R1547

## (undated) DEVICE — SU VICRYL 3-0 SH 27" J316H

## (undated) DEVICE — PREP CHLORAPREP 26ML TINTED HI-LITE ORANGE 930815

## (undated) DEVICE — NIM PROBE PRASS STIMULATOR PROTECTED TIP 8225101

## (undated) DEVICE — LINEN FULL SHEET 5511

## (undated) DEVICE — SYR 03ML LL W/O NDL 309657

## (undated) DEVICE — PREP CHLORAPREP 10.5ML CLR

## (undated) DEVICE — LINEN HALF SHEET 5512

## (undated) DEVICE — SU MONOCRYL 4-0 PS-2 27" UND Y426H

## (undated) DEVICE — ESU LIGASURE DISSECTOR EXACT LF2019

## (undated) DEVICE — SU VICRYL 3-0 TIE 12X18" J904T

## (undated) DEVICE — LINEN TOWEL PACK X5 5464

## (undated) DEVICE — GLOVE BIOGEL PI MICRO SZ 6.5 48565

## (undated) DEVICE — SU DERMABOND ADVANCED .7ML DNX12

## (undated) DEVICE — NDL 22GA 1.5"

## (undated) DEVICE — DRSG TEGADERM 4X4 3/4" 1626W

## (undated) DEVICE — ESU GROUND PAD ADULT W/CORD E7507

## (undated) DEVICE — GLOVE BIOGEL PI MICRO INDICATOR UNDERGLOVE SZ 6.5 48965

## (undated) DEVICE — SOL NACL 0.9% IRRIG 1000ML BOTTLE 2F7124

## (undated) RX ORDER — PROPOFOL 10 MG/ML
INJECTION, EMULSION INTRAVENOUS
Status: DISPENSED
Start: 2023-12-22

## (undated) RX ORDER — BUPIVACAINE HYDROCHLORIDE 5 MG/ML
INJECTION, SOLUTION EPIDURAL; INTRACAUDAL
Status: DISPENSED
Start: 2023-12-22

## (undated) RX ORDER — FENTANYL CITRATE 50 UG/ML
INJECTION, SOLUTION INTRAMUSCULAR; INTRAVENOUS
Status: DISPENSED
Start: 2023-12-22

## (undated) RX ORDER — FENTANYL CITRATE-0.9 % NACL/PF 10 MCG/ML
PLASTIC BAG, INJECTION (ML) INTRAVENOUS
Status: DISPENSED
Start: 2023-12-22

## (undated) RX ORDER — ONDANSETRON 2 MG/ML
INJECTION INTRAMUSCULAR; INTRAVENOUS
Status: DISPENSED
Start: 2023-12-22

## (undated) RX ORDER — KETOROLAC TROMETHAMINE 15 MG/ML
INJECTION, SOLUTION INTRAMUSCULAR; INTRAVENOUS
Status: DISPENSED
Start: 2023-12-22

## (undated) RX ORDER — CEFAZOLIN SODIUM/WATER 2 G/20 ML
SYRINGE (ML) INTRAVENOUS
Status: DISPENSED
Start: 2023-12-22

## (undated) RX ORDER — GLYCOPYRROLATE 0.2 MG/ML
INJECTION INTRAMUSCULAR; INTRAVENOUS
Status: DISPENSED
Start: 2023-12-22

## (undated) RX ORDER — DEXAMETHASONE SODIUM PHOSPHATE 4 MG/ML
INJECTION, SOLUTION INTRA-ARTICULAR; INTRALESIONAL; INTRAMUSCULAR; INTRAVENOUS; SOFT TISSUE
Status: DISPENSED
Start: 2023-12-22

## (undated) RX ORDER — LIDOCAINE HYDROCHLORIDE 10 MG/ML
INJECTION, SOLUTION EPIDURAL; INFILTRATION; INTRACAUDAL; PERINEURAL
Status: DISPENSED
Start: 2023-12-22

## (undated) RX ORDER — EPHEDRINE SULFATE 50 MG/ML
INJECTION, SOLUTION INTRAMUSCULAR; INTRAVENOUS; SUBCUTANEOUS
Status: DISPENSED
Start: 2023-12-22